# Patient Record
Sex: FEMALE | Race: WHITE
[De-identification: names, ages, dates, MRNs, and addresses within clinical notes are randomized per-mention and may not be internally consistent; named-entity substitution may affect disease eponyms.]

---

## 2019-11-25 ENCOUNTER — HOSPITAL ENCOUNTER (OUTPATIENT)
Dept: HOSPITAL 8 - STAR | Age: 69
Discharge: HOME | End: 2019-11-25
Attending: SPECIALIST
Payer: MEDICARE

## 2019-11-25 DIAGNOSIS — Z01.818: Primary | ICD-10-CM

## 2019-11-25 DIAGNOSIS — R10.2: ICD-10-CM

## 2019-11-25 DIAGNOSIS — R32: ICD-10-CM

## 2019-11-25 DIAGNOSIS — Z87.891: ICD-10-CM

## 2019-11-25 LAB
CULTURE INDICATED?: NO
MICROSCOPIC: (no result)

## 2019-11-25 PROCEDURE — 93005 ELECTROCARDIOGRAM TRACING: CPT

## 2019-11-25 PROCEDURE — 81001 URINALYSIS AUTO W/SCOPE: CPT

## 2019-12-09 ENCOUNTER — HOSPITAL ENCOUNTER (OUTPATIENT)
Dept: HOSPITAL 8 - OUT | Age: 69
Discharge: HOME | End: 2019-12-09
Attending: SPECIALIST
Payer: MEDICARE

## 2019-12-09 VITALS — HEIGHT: 62 IN | BODY MASS INDEX: 24.5 KG/M2 | WEIGHT: 133.16 LBS

## 2019-12-09 DIAGNOSIS — E11.9: ICD-10-CM

## 2019-12-09 DIAGNOSIS — Z88.5: ICD-10-CM

## 2019-12-09 DIAGNOSIS — N99.3: Primary | ICD-10-CM

## 2019-12-09 DIAGNOSIS — Z79.899: ICD-10-CM

## 2019-12-09 DIAGNOSIS — N39.46: ICD-10-CM

## 2019-12-09 DIAGNOSIS — J45.909: ICD-10-CM

## 2019-12-09 DIAGNOSIS — R10.2: ICD-10-CM

## 2019-12-09 DIAGNOSIS — I10: ICD-10-CM

## 2019-12-09 DIAGNOSIS — Z87.891: ICD-10-CM

## 2019-12-09 PROCEDURE — C1771 REP DEV, URINARY, W/SLING: HCPCS

## 2019-12-09 PROCEDURE — 57288 REPAIR BLADDER DEFECT: CPT

## 2019-12-09 PROCEDURE — 57265 CMBN AP COLPRHY W/NTRCL RPR: CPT

## 2019-12-09 PROCEDURE — 57282 COLPOPEXY EXTRAPERITONEAL: CPT

## 2020-06-18 ENCOUNTER — HOSPITAL ENCOUNTER (OUTPATIENT)
Dept: HOSPITAL 8 - STAR | Age: 70
Discharge: HOME | End: 2020-06-18
Attending: SPECIALIST
Payer: MEDICARE

## 2020-06-18 DIAGNOSIS — N39.3: ICD-10-CM

## 2020-06-18 DIAGNOSIS — Z01.818: Primary | ICD-10-CM

## 2020-06-18 DIAGNOSIS — R10.2: ICD-10-CM

## 2020-06-18 PROCEDURE — 93005 ELECTROCARDIOGRAM TRACING: CPT

## 2020-06-18 PROCEDURE — 36415 COLL VENOUS BLD VENIPUNCTURE: CPT

## 2020-06-18 PROCEDURE — 87635 SARS-COV-2 COVID-19 AMP PRB: CPT

## 2020-06-22 ENCOUNTER — HOSPITAL ENCOUNTER (OUTPATIENT)
Dept: HOSPITAL 8 - OUT | Age: 70
Discharge: HOME | End: 2020-06-22
Attending: SPECIALIST
Payer: MEDICARE

## 2020-06-22 VITALS — BODY MASS INDEX: 24.14 KG/M2 | HEIGHT: 62 IN | WEIGHT: 131.18 LBS

## 2020-06-22 VITALS — DIASTOLIC BLOOD PRESSURE: 79 MMHG | SYSTOLIC BLOOD PRESSURE: 212 MMHG

## 2020-06-22 DIAGNOSIS — N81.10: ICD-10-CM

## 2020-06-22 DIAGNOSIS — I10: ICD-10-CM

## 2020-06-22 DIAGNOSIS — J44.9: ICD-10-CM

## 2020-06-22 DIAGNOSIS — Z11.59: ICD-10-CM

## 2020-06-22 DIAGNOSIS — R10.2: ICD-10-CM

## 2020-06-22 DIAGNOSIS — N39.46: ICD-10-CM

## 2020-06-22 DIAGNOSIS — Z88.5: ICD-10-CM

## 2020-06-22 DIAGNOSIS — Z90.710: ICD-10-CM

## 2020-06-22 DIAGNOSIS — N81.11: ICD-10-CM

## 2020-06-22 DIAGNOSIS — N81.6: ICD-10-CM

## 2020-06-22 DIAGNOSIS — E78.00: ICD-10-CM

## 2020-06-22 DIAGNOSIS — Z79.899: ICD-10-CM

## 2020-06-22 DIAGNOSIS — N81.89: Primary | ICD-10-CM

## 2020-06-22 DIAGNOSIS — E11.9: ICD-10-CM

## 2020-06-22 PROCEDURE — 57282 COLPOPEXY EXTRAPERITONEAL: CPT

## 2020-06-22 PROCEDURE — 94640 AIRWAY INHALATION TREATMENT: CPT

## 2020-06-22 PROCEDURE — 57288 REPAIR BLADDER DEFECT: CPT

## 2020-06-22 PROCEDURE — 36415 COLL VENOUS BLD VENIPUNCTURE: CPT

## 2020-06-22 PROCEDURE — 57265 CMBN AP COLPRHY W/NTRCL RPR: CPT

## 2020-06-22 PROCEDURE — C1771 REP DEV, URINARY, W/SLING: HCPCS

## 2020-06-22 PROCEDURE — 87635 SARS-COV-2 COVID-19 AMP PRB: CPT

## 2020-06-22 RX ADMIN — OXYCODONE HYDROCHLORIDE PRN MG: 5 SOLUTION ORAL at 16:46

## 2020-06-22 RX ADMIN — HYDRALAZINE HYDROCHLORIDE PRN MG: 20 INJECTION INTRAMUSCULAR; INTRAVENOUS at 16:42

## 2020-06-22 RX ADMIN — OXYCODONE HYDROCHLORIDE PRN MG: 5 SOLUTION ORAL at 21:46

## 2020-06-22 RX ADMIN — FENTANYL CITRATE PRN MCG: 50 INJECTION INTRAMUSCULAR; INTRAVENOUS at 17:05

## 2020-06-22 RX ADMIN — OXYCODONE HYDROCHLORIDE PRN MG: 5 SOLUTION ORAL at 18:33

## 2020-06-22 RX ADMIN — FENTANYL CITRATE PRN MCG: 50 INJECTION INTRAMUSCULAR; INTRAVENOUS at 16:52

## 2020-06-22 RX ADMIN — HYDRALAZINE HYDROCHLORIDE PRN MG: 20 INJECTION INTRAMUSCULAR; INTRAVENOUS at 16:21

## 2020-06-22 RX ADMIN — HYDRALAZINE HYDROCHLORIDE PRN MG: 20 INJECTION INTRAMUSCULAR; INTRAVENOUS at 21:47

## 2022-11-03 ENCOUNTER — HOSPITAL ENCOUNTER (INPATIENT)
Facility: MEDICAL CENTER | Age: 72
LOS: 6 days | DRG: 674 | End: 2022-11-09
Attending: INTERNAL MEDICINE | Admitting: INTERNAL MEDICINE
Payer: MEDICARE

## 2022-11-03 DIAGNOSIS — N18.4 ACUTE RENAL FAILURE SUPERIMPOSED ON STAGE 4 CHRONIC KIDNEY DISEASE, UNSPECIFIED ACUTE RENAL FAILURE TYPE (HCC): ICD-10-CM

## 2022-11-03 DIAGNOSIS — J44.1 CHRONIC OBSTRUCTIVE PULMONARY DISEASE WITH ACUTE EXACERBATION (HCC): ICD-10-CM

## 2022-11-03 DIAGNOSIS — R11.0 NAUSEA: ICD-10-CM

## 2022-11-03 DIAGNOSIS — N17.9 ACUTE RENAL FAILURE SUPERIMPOSED ON STAGE 4 CHRONIC KIDNEY DISEASE, UNSPECIFIED ACUTE RENAL FAILURE TYPE (HCC): ICD-10-CM

## 2022-11-03 LAB
ALBUMIN SERPL BCP-MCNC: 4.4 G/DL (ref 3.2–4.9)
ALBUMIN SERPL BCP-MCNC: 4.4 G/DL (ref 3.2–4.9)
ALBUMIN/GLOB SERPL: 1.6 G/DL
ALP SERPL-CCNC: 71 U/L (ref 30–99)
ALT SERPL-CCNC: 13 U/L (ref 2–50)
ANION GAP SERPL CALC-SCNC: 20 MMOL/L (ref 7–16)
AST SERPL-CCNC: 13 U/L (ref 12–45)
BASOPHILS # BLD AUTO: 0.1 % (ref 0–1.8)
BASOPHILS # BLD: 0.01 K/UL (ref 0–0.12)
BILIRUB SERPL-MCNC: 0.2 MG/DL (ref 0.1–1.5)
BUN SERPL-MCNC: 94 MG/DL (ref 8–22)
BUN SERPL-MCNC: 94 MG/DL (ref 8–22)
CALCIUM SERPL-MCNC: 8.4 MG/DL (ref 8.5–10.5)
CALCIUM SERPL-MCNC: 8.5 MG/DL (ref 8.5–10.5)
CHLORIDE SERPL-SCNC: 97 MMOL/L (ref 96–112)
CHLORIDE SERPL-SCNC: 98 MMOL/L (ref 96–112)
CO2 SERPL-SCNC: 21 MMOL/L (ref 20–33)
CO2 SERPL-SCNC: 21 MMOL/L (ref 20–33)
CREAT SERPL-MCNC: 5.74 MG/DL (ref 0.5–1.4)
CREAT SERPL-MCNC: 5.82 MG/DL (ref 0.5–1.4)
CRP SERPL HS-MCNC: 0.47 MG/DL (ref 0–0.75)
EKG IMPRESSION: NORMAL
EOSINOPHIL # BLD AUTO: 0 K/UL (ref 0–0.51)
EOSINOPHIL NFR BLD: 0 % (ref 0–6.9)
ERYTHROCYTE [DISTWIDTH] IN BLOOD BY AUTOMATED COUNT: 43 FL (ref 35.9–50)
ERYTHROCYTE [SEDIMENTATION RATE] IN BLOOD BY WESTERGREN METHOD: 23 MM/HOUR (ref 0–25)
GFR SERPLBLD CREATININE-BSD FMLA CKD-EPI: 7 ML/MIN/1.73 M 2
GFR SERPLBLD CREATININE-BSD FMLA CKD-EPI: 7 ML/MIN/1.73 M 2
GLOBULIN SER CALC-MCNC: 2.8 G/DL (ref 1.9–3.5)
GLUCOSE SERPL-MCNC: 180 MG/DL (ref 65–99)
GLUCOSE SERPL-MCNC: 183 MG/DL (ref 65–99)
HCT VFR BLD AUTO: 35.5 % (ref 37–47)
HGB BLD-MCNC: 11.6 G/DL (ref 12–16)
IMM GRANULOCYTES # BLD AUTO: 0.16 K/UL (ref 0–0.11)
IMM GRANULOCYTES NFR BLD AUTO: 1.2 % (ref 0–0.9)
LYMPHOCYTES # BLD AUTO: 0.64 K/UL (ref 1–4.8)
LYMPHOCYTES NFR BLD: 4.7 % (ref 22–41)
MAGNESIUM SERPL-MCNC: 2.2 MG/DL (ref 1.5–2.5)
MCH RBC QN AUTO: 30.6 PG (ref 27–33)
MCHC RBC AUTO-ENTMCNC: 32.7 G/DL (ref 33.6–35)
MCV RBC AUTO: 93.7 FL (ref 81.4–97.8)
MONOCYTES # BLD AUTO: 0.32 K/UL (ref 0–0.85)
MONOCYTES NFR BLD AUTO: 2.4 % (ref 0–13.4)
NEUTROPHILS # BLD AUTO: 12.42 K/UL (ref 2–7.15)
NEUTROPHILS NFR BLD: 91.6 % (ref 44–72)
NRBC # BLD AUTO: 0 K/UL
NRBC BLD-RTO: 0 /100 WBC
PHOSPHATE SERPL-MCNC: 7.9 MG/DL (ref 2.5–4.5)
PHOSPHATE SERPL-MCNC: 7.9 MG/DL (ref 2.5–4.5)
PLATELET # BLD AUTO: 349 K/UL (ref 164–446)
PMV BLD AUTO: 9.6 FL (ref 9–12.9)
POTASSIUM SERPL-SCNC: 4 MMOL/L (ref 3.6–5.5)
POTASSIUM SERPL-SCNC: 4.1 MMOL/L (ref 3.6–5.5)
PROT SERPL-MCNC: 7.2 G/DL (ref 6–8.2)
RBC # BLD AUTO: 3.79 M/UL (ref 4.2–5.4)
SODIUM SERPL-SCNC: 138 MMOL/L (ref 135–145)
SODIUM SERPL-SCNC: 139 MMOL/L (ref 135–145)
URATE SERPL-MCNC: 9.3 MG/DL (ref 1.9–8.2)
WBC # BLD AUTO: 13.6 K/UL (ref 4.8–10.8)

## 2022-11-03 PROCEDURE — 99223 1ST HOSP IP/OBS HIGH 75: CPT | Performed by: STUDENT IN AN ORGANIZED HEALTH CARE EDUCATION/TRAINING PROGRAM

## 2022-11-03 PROCEDURE — 83880 ASSAY OF NATRIURETIC PEPTIDE: CPT

## 2022-11-03 PROCEDURE — 36415 COLL VENOUS BLD VENIPUNCTURE: CPT

## 2022-11-03 PROCEDURE — 80053 COMPREHEN METABOLIC PANEL: CPT

## 2022-11-03 PROCEDURE — 83735 ASSAY OF MAGNESIUM: CPT

## 2022-11-03 PROCEDURE — 80069 RENAL FUNCTION PANEL: CPT

## 2022-11-03 PROCEDURE — 770006 HCHG ROOM/CARE - MED/SURG/GYN SEMI*

## 2022-11-03 PROCEDURE — 84550 ASSAY OF BLOOD/URIC ACID: CPT

## 2022-11-03 PROCEDURE — 85025 COMPLETE CBC W/AUTO DIFF WBC: CPT

## 2022-11-03 PROCEDURE — 84100 ASSAY OF PHOSPHORUS: CPT

## 2022-11-03 PROCEDURE — 700102 HCHG RX REV CODE 250 W/ 637 OVERRIDE(OP): Performed by: STUDENT IN AN ORGANIZED HEALTH CARE EDUCATION/TRAINING PROGRAM

## 2022-11-03 PROCEDURE — 84145 PROCALCITONIN (PCT): CPT

## 2022-11-03 PROCEDURE — 86140 C-REACTIVE PROTEIN: CPT

## 2022-11-03 PROCEDURE — A9270 NON-COVERED ITEM OR SERVICE: HCPCS | Performed by: STUDENT IN AN ORGANIZED HEALTH CARE EDUCATION/TRAINING PROGRAM

## 2022-11-03 PROCEDURE — 93010 ELECTROCARDIOGRAM REPORT: CPT | Performed by: INTERNAL MEDICINE

## 2022-11-03 PROCEDURE — 93005 ELECTROCARDIOGRAM TRACING: CPT | Performed by: STUDENT IN AN ORGANIZED HEALTH CARE EDUCATION/TRAINING PROGRAM

## 2022-11-03 PROCEDURE — 85652 RBC SED RATE AUTOMATED: CPT

## 2022-11-03 PROCEDURE — 82550 ASSAY OF CK (CPK): CPT

## 2022-11-03 RX ORDER — ACETAMINOPHEN 325 MG/1
650 TABLET ORAL EVERY 6 HOURS PRN
Status: DISCONTINUED | OUTPATIENT
Start: 2022-11-03 | End: 2022-11-09 | Stop reason: HOSPADM

## 2022-11-03 RX ORDER — DIPHENHYDRAMINE HCL 25 MG
25 TABLET ORAL EVERY 6 HOURS PRN
Status: ON HOLD | COMMUNITY
End: 2022-11-09

## 2022-11-03 RX ORDER — CARVEDILOL 25 MG/1
25 TABLET ORAL 2 TIMES DAILY WITH MEALS
COMMUNITY

## 2022-11-03 RX ORDER — POTASSIUM CITRATE 10 MEQ/1
10 TABLET, EXTENDED RELEASE ORAL DAILY
COMMUNITY

## 2022-11-03 RX ORDER — EZETIMIBE 10 MG/1
10 TABLET ORAL DAILY
COMMUNITY

## 2022-11-03 RX ORDER — ALBUTEROL SULFATE 90 UG/1
2 AEROSOL, METERED RESPIRATORY (INHALATION) EVERY 4 HOURS PRN
COMMUNITY

## 2022-11-03 RX ORDER — HYDRALAZINE HYDROCHLORIDE 50 MG/1
100 TABLET, FILM COATED ORAL EVERY 8 HOURS
Status: DISCONTINUED | OUTPATIENT
Start: 2022-11-03 | End: 2022-11-09 | Stop reason: HOSPADM

## 2022-11-03 RX ORDER — CARVEDILOL 25 MG/1
25 TABLET ORAL 2 TIMES DAILY WITH MEALS
Status: DISCONTINUED | OUTPATIENT
Start: 2022-11-03 | End: 2022-11-09 | Stop reason: HOSPADM

## 2022-11-03 RX ORDER — CALCIUM ACETATE 667 MG/1
667 TABLET ORAL
Status: DISCONTINUED | OUTPATIENT
Start: 2022-11-04 | End: 2022-11-09 | Stop reason: HOSPADM

## 2022-11-03 RX ORDER — LABETALOL HYDROCHLORIDE 5 MG/ML
10 INJECTION, SOLUTION INTRAVENOUS EVERY 4 HOURS PRN
Status: DISCONTINUED | OUTPATIENT
Start: 2022-11-03 | End: 2022-11-09 | Stop reason: HOSPADM

## 2022-11-03 RX ORDER — AMOXICILLIN 250 MG
2 CAPSULE ORAL 2 TIMES DAILY
Status: DISCONTINUED | OUTPATIENT
Start: 2022-11-04 | End: 2022-11-09 | Stop reason: HOSPADM

## 2022-11-03 RX ORDER — AMLODIPINE BESYLATE 10 MG/1
10 TABLET ORAL
Status: DISCONTINUED | OUTPATIENT
Start: 2022-11-04 | End: 2022-11-09 | Stop reason: HOSPADM

## 2022-11-03 RX ORDER — TRAZODONE HYDROCHLORIDE 50 MG/1
50 TABLET ORAL
Status: DISCONTINUED | OUTPATIENT
Start: 2022-11-03 | End: 2022-11-09 | Stop reason: HOSPADM

## 2022-11-03 RX ORDER — IPRATROPIUM BROMIDE AND ALBUTEROL SULFATE 2.5; .5 MG/3ML; MG/3ML
3 SOLUTION RESPIRATORY (INHALATION)
Status: DISCONTINUED | OUTPATIENT
Start: 2022-11-03 | End: 2022-11-09 | Stop reason: HOSPADM

## 2022-11-03 RX ORDER — PAROXETINE 10 MG/1
10 TABLET, FILM COATED ORAL DAILY
COMMUNITY

## 2022-11-03 RX ORDER — TRAZODONE HYDROCHLORIDE 50 MG/1
25 TABLET ORAL NIGHTLY
COMMUNITY

## 2022-11-03 RX ORDER — AMLODIPINE BESYLATE 10 MG/1
10 TABLET ORAL DAILY
COMMUNITY

## 2022-11-03 RX ORDER — LOPERAMIDE HYDROCHLORIDE 2 MG/1
2 CAPSULE ORAL 4 TIMES DAILY PRN
COMMUNITY

## 2022-11-03 RX ORDER — PAROXETINE 10 MG/1
10 TABLET, FILM COATED ORAL DAILY
Status: DISCONTINUED | OUTPATIENT
Start: 2022-11-04 | End: 2022-11-09 | Stop reason: HOSPADM

## 2022-11-03 RX ORDER — BISACODYL 10 MG
10 SUPPOSITORY, RECTAL RECTAL
Status: DISCONTINUED | OUTPATIENT
Start: 2022-11-03 | End: 2022-11-09 | Stop reason: HOSPADM

## 2022-11-03 RX ORDER — EZETIMIBE 10 MG/1
10 TABLET ORAL DAILY
Status: DISCONTINUED | OUTPATIENT
Start: 2022-11-04 | End: 2022-11-09 | Stop reason: HOSPADM

## 2022-11-03 RX ORDER — TORSEMIDE 20 MG/1
20 TABLET ORAL DAILY
COMMUNITY

## 2022-11-03 RX ORDER — POLYETHYLENE GLYCOL 3350 17 G/17G
1 POWDER, FOR SOLUTION ORAL
Status: DISCONTINUED | OUTPATIENT
Start: 2022-11-03 | End: 2022-11-09 | Stop reason: HOSPADM

## 2022-11-03 RX ORDER — GUAIFENESIN 600 MG/1
600 TABLET, EXTENDED RELEASE ORAL EVERY 12 HOURS
COMMUNITY

## 2022-11-03 RX ADMIN — CARVEDILOL 25 MG: 25 TABLET, FILM COATED ORAL at 21:48

## 2022-11-03 RX ADMIN — TRAZODONE HYDROCHLORIDE 50 MG: 50 TABLET ORAL at 21:49

## 2022-11-03 RX ADMIN — HYDRALAZINE HYDROCHLORIDE 100 MG: 50 TABLET, FILM COATED ORAL at 21:48

## 2022-11-03 ASSESSMENT — COGNITIVE AND FUNCTIONAL STATUS - GENERAL
SUGGESTED CMS G CODE MODIFIER DAILY ACTIVITY: CH
DAILY ACTIVITIY SCORE: 24
SUGGESTED CMS G CODE MODIFIER MOBILITY: CH
MOBILITY SCORE: 24

## 2022-11-03 ASSESSMENT — PATIENT HEALTH QUESTIONNAIRE - PHQ9
1. LITTLE INTEREST OR PLEASURE IN DOING THINGS: NOT AT ALL
SUM OF ALL RESPONSES TO PHQ9 QUESTIONS 1 AND 2: 0
2. FEELING DOWN, DEPRESSED, IRRITABLE, OR HOPELESS: NOT AT ALL

## 2022-11-03 ASSESSMENT — LIFESTYLE VARIABLES
EVER FELT BAD OR GUILTY ABOUT YOUR DRINKING: NO
ALCOHOL_USE: NO
HAVE YOU EVER FELT YOU SHOULD CUT DOWN ON YOUR DRINKING: NO
CONSUMPTION TOTAL: NEGATIVE
TOTAL SCORE: 0
HOW MANY TIMES IN THE PAST YEAR HAVE YOU HAD 5 OR MORE DRINKS IN A DAY: 0
HAVE PEOPLE ANNOYED YOU BY CRITICIZING YOUR DRINKING: NO
TOTAL SCORE: 0
TOTAL SCORE: 0
AVERAGE NUMBER OF DAYS PER WEEK YOU HAVE A DRINK CONTAINING ALCOHOL: 0
ON A TYPICAL DAY WHEN YOU DRINK ALCOHOL HOW MANY DRINKS DO YOU HAVE: 0
EVER HAD A DRINK FIRST THING IN THE MORNING TO STEADY YOUR NERVES TO GET RID OF A HANGOVER: NO

## 2022-11-03 ASSESSMENT — PAIN DESCRIPTION - PAIN TYPE: TYPE: ACUTE PAIN

## 2022-11-03 NOTE — PROGRESS NOTES
[facility]  Kannan was admitted at the outside facility for COPD and shortness of breath., She has known CKD. COPD exac resolved, no evidence of pneumonia however Cr- creeped up from 3 to 5. K WNL bicarb 18. Per hospitalist there no NSAIDs given no hypotension (actually patient hypertensive), no renotoxins, no clinical signs of rhabdomyolysis. Urionalysis no evidence of UTI. No new drugs or changes to medications. I asked if a CT or ultrasound was done to r/o obstructive process, per hospitalist this was ordered and PENDING.  Dr. Butler Nephrology was called and she recommended transfer to [facility] for Nephrology evaluation.

## 2022-11-04 ENCOUNTER — APPOINTMENT (OUTPATIENT)
Dept: RADIOLOGY | Facility: MEDICAL CENTER | Age: 72
DRG: 674 | End: 2022-11-04
Attending: HOSPITALIST
Payer: MEDICARE

## 2022-11-04 PROBLEM — I35.0 MODERATE AORTIC STENOSIS: Status: ACTIVE | Noted: 2022-11-04

## 2022-11-04 PROBLEM — I10 PRIMARY HYPERTENSION: Status: ACTIVE | Noted: 2022-11-04

## 2022-11-04 PROBLEM — D72.829 LEUKOCYTOSIS: Status: ACTIVE | Noted: 2022-11-04

## 2022-11-04 PROBLEM — E11.9 DIABETES MELLITUS (HCC): Status: ACTIVE | Noted: 2022-11-04

## 2022-11-04 PROBLEM — J44.9 COPD (CHRONIC OBSTRUCTIVE PULMONARY DISEASE) (HCC): Status: ACTIVE | Noted: 2022-11-04

## 2022-11-04 PROBLEM — I27.21 PULMONARY ARTERY HYPERTENSION (HCC): Status: ACTIVE | Noted: 2022-11-04

## 2022-11-04 PROBLEM — I50.32 CHRONIC HEART FAILURE WITH PRESERVED EJECTION FRACTION (HCC): Status: ACTIVE | Noted: 2022-11-04

## 2022-11-04 PROBLEM — D64.9 ANEMIA: Status: ACTIVE | Noted: 2022-11-04

## 2022-11-04 PROBLEM — N18.4 ACUTE RENAL FAILURE SUPERIMPOSED ON STAGE 4 CHRONIC KIDNEY DISEASE (HCC): Status: ACTIVE | Noted: 2022-11-03

## 2022-11-04 LAB
BASOPHILS # BLD AUTO: 0.1 % (ref 0–1.8)
BASOPHILS # BLD: 0.01 K/UL (ref 0–0.12)
CK SERPL-CCNC: 48 U/L (ref 0–154)
CREAT UR-MCNC: 49.41 MG/DL
EOSINOPHIL # BLD AUTO: 0 K/UL (ref 0–0.51)
EOSINOPHIL NFR BLD: 0 % (ref 0–6.9)
ERYTHROCYTE [DISTWIDTH] IN BLOOD BY AUTOMATED COUNT: 42.3 FL (ref 35.9–50)
GLUCOSE BLD STRIP.AUTO-MCNC: 104 MG/DL (ref 65–99)
GLUCOSE BLD STRIP.AUTO-MCNC: 125 MG/DL (ref 65–99)
GLUCOSE BLD STRIP.AUTO-MCNC: 139 MG/DL (ref 65–99)
GLUCOSE BLD STRIP.AUTO-MCNC: 142 MG/DL (ref 65–99)
HCT VFR BLD AUTO: 34.2 % (ref 37–47)
HGB BLD-MCNC: 10.8 G/DL (ref 12–16)
IMM GRANULOCYTES # BLD AUTO: 0.13 K/UL (ref 0–0.11)
IMM GRANULOCYTES NFR BLD AUTO: 1.3 % (ref 0–0.9)
LYMPHOCYTES # BLD AUTO: 0.66 K/UL (ref 1–4.8)
LYMPHOCYTES NFR BLD: 6.8 % (ref 22–41)
MCH RBC QN AUTO: 29.3 PG (ref 27–33)
MCHC RBC AUTO-ENTMCNC: 31.6 G/DL (ref 33.6–35)
MCV RBC AUTO: 92.7 FL (ref 81.4–97.8)
MONOCYTES # BLD AUTO: 0.48 K/UL (ref 0–0.85)
MONOCYTES NFR BLD AUTO: 5 % (ref 0–13.4)
NEUTROPHILS # BLD AUTO: 8.36 K/UL (ref 2–7.15)
NEUTROPHILS NFR BLD: 86.8 % (ref 44–72)
NRBC # BLD AUTO: 0 K/UL
NRBC BLD-RTO: 0 /100 WBC
NT-PROBNP SERPL IA-MCNC: ABNORMAL PG/ML (ref 0–125)
NT-PROBNP SERPL IA-MCNC: ABNORMAL PG/ML (ref 0–125)
PLATELET # BLD AUTO: 301 K/UL (ref 164–446)
PMV BLD AUTO: 9.6 FL (ref 9–12.9)
PROCALCITONIN SERPL-MCNC: 0.13 NG/ML
PROCALCITONIN SERPL-MCNC: 0.13 NG/ML
RBC # BLD AUTO: 3.69 M/UL (ref 4.2–5.4)
SODIUM UR-SCNC: 71 MMOL/L
WBC # BLD AUTO: 9.6 K/UL (ref 4.8–10.8)

## 2022-11-04 PROCEDURE — 36415 COLL VENOUS BLD VENIPUNCTURE: CPT

## 2022-11-04 PROCEDURE — 84300 ASSAY OF URINE SODIUM: CPT

## 2022-11-04 PROCEDURE — 84145 PROCALCITONIN (PCT): CPT

## 2022-11-04 PROCEDURE — 71045 X-RAY EXAM CHEST 1 VIEW: CPT

## 2022-11-04 PROCEDURE — 700102 HCHG RX REV CODE 250 W/ 637 OVERRIDE(OP): Performed by: STUDENT IN AN ORGANIZED HEALTH CARE EDUCATION/TRAINING PROGRAM

## 2022-11-04 PROCEDURE — 700111 HCHG RX REV CODE 636 W/ 250 OVERRIDE (IP): Performed by: STUDENT IN AN ORGANIZED HEALTH CARE EDUCATION/TRAINING PROGRAM

## 2022-11-04 PROCEDURE — 85025 COMPLETE CBC W/AUTO DIFF WBC: CPT

## 2022-11-04 PROCEDURE — 83880 ASSAY OF NATRIURETIC PEPTIDE: CPT

## 2022-11-04 PROCEDURE — 700111 HCHG RX REV CODE 636 W/ 250 OVERRIDE (IP): Performed by: HOSPITALIST

## 2022-11-04 PROCEDURE — 82570 ASSAY OF URINE CREATININE: CPT

## 2022-11-04 PROCEDURE — 770006 HCHG ROOM/CARE - MED/SURG/GYN SEMI*

## 2022-11-04 PROCEDURE — 82962 GLUCOSE BLOOD TEST: CPT | Mod: 91

## 2022-11-04 PROCEDURE — A9270 NON-COVERED ITEM OR SERVICE: HCPCS | Performed by: STUDENT IN AN ORGANIZED HEALTH CARE EDUCATION/TRAINING PROGRAM

## 2022-11-04 PROCEDURE — 51798 US URINE CAPACITY MEASURE: CPT

## 2022-11-04 PROCEDURE — 99232 SBSQ HOSP IP/OBS MODERATE 35: CPT | Performed by: HOSPITALIST

## 2022-11-04 RX ORDER — HEPARIN SODIUM 5000 [USP'U]/ML
5000 INJECTION, SOLUTION INTRAVENOUS; SUBCUTANEOUS EVERY 8 HOURS
Status: DISCONTINUED | OUTPATIENT
Start: 2022-11-04 | End: 2022-11-09 | Stop reason: HOSPADM

## 2022-11-04 RX ORDER — ONDANSETRON 2 MG/ML
4 INJECTION INTRAMUSCULAR; INTRAVENOUS EVERY 4 HOURS PRN
Status: DISCONTINUED | OUTPATIENT
Start: 2022-11-04 | End: 2022-11-09 | Stop reason: HOSPADM

## 2022-11-04 RX ADMIN — SENNOSIDES AND DOCUSATE SODIUM 2 TABLET: 50; 8.6 TABLET ORAL at 06:09

## 2022-11-04 RX ADMIN — CARVEDILOL 25 MG: 25 TABLET, FILM COATED ORAL at 18:11

## 2022-11-04 RX ADMIN — Medication 667 MG: at 08:34

## 2022-11-04 RX ADMIN — Medication 667 MG: at 13:54

## 2022-11-04 RX ADMIN — CARVEDILOL 25 MG: 25 TABLET, FILM COATED ORAL at 08:34

## 2022-11-04 RX ADMIN — HYDRALAZINE HYDROCHLORIDE 100 MG: 50 TABLET, FILM COATED ORAL at 06:10

## 2022-11-04 RX ADMIN — HEPARIN SODIUM 5000 UNITS: 5000 INJECTION, SOLUTION INTRAVENOUS; SUBCUTANEOUS at 20:45

## 2022-11-04 RX ADMIN — HYDRALAZINE HYDROCHLORIDE 100 MG: 50 TABLET, FILM COATED ORAL at 20:44

## 2022-11-04 RX ADMIN — HEPARIN SODIUM 5000 UNITS: 5000 INJECTION, SOLUTION INTRAVENOUS; SUBCUTANEOUS at 06:10

## 2022-11-04 RX ADMIN — HYDRALAZINE HYDROCHLORIDE 100 MG: 50 TABLET, FILM COATED ORAL at 13:54

## 2022-11-04 RX ADMIN — AMLODIPINE BESYLATE 10 MG: 10 TABLET ORAL at 06:10

## 2022-11-04 RX ADMIN — HEPARIN SODIUM 5000 UNITS: 5000 INJECTION, SOLUTION INTRAVENOUS; SUBCUTANEOUS at 13:54

## 2022-11-04 RX ADMIN — ONDANSETRON 4 MG: 2 INJECTION INTRAMUSCULAR; INTRAVENOUS at 13:02

## 2022-11-04 RX ADMIN — TRAZODONE HYDROCHLORIDE 50 MG: 50 TABLET ORAL at 20:49

## 2022-11-04 RX ADMIN — PAROXETINE HYDROCHLORIDE 10 MG: 10 TABLET, FILM COATED ORAL at 06:10

## 2022-11-04 RX ADMIN — Medication 667 MG: at 18:11

## 2022-11-04 RX ADMIN — EZETIMIBE 10 MG: 10 TABLET ORAL at 06:09

## 2022-11-04 ASSESSMENT — COGNITIVE AND FUNCTIONAL STATUS - GENERAL
SUGGESTED CMS G CODE MODIFIER MOBILITY: CH
DAILY ACTIVITIY SCORE: 24
SUGGESTED CMS G CODE MODIFIER DAILY ACTIVITY: CH
MOBILITY SCORE: 24

## 2022-11-04 ASSESSMENT — LIFESTYLE VARIABLES: SUBSTANCE_ABUSE: 0

## 2022-11-04 ASSESSMENT — ENCOUNTER SYMPTOMS
WHEEZING: 1
MYALGIAS: 0
DIZZINESS: 0
COUGH: 1
SHORTNESS OF BREATH: 1
DIARRHEA: 0
HEARTBURN: 0
SINUS PAIN: 0
ABDOMINAL PAIN: 0
FALLS: 0
CONSTIPATION: 0
SPUTUM PRODUCTION: 1
TREMORS: 0
CLAUDICATION: 0
FEVER: 0
NAUSEA: 0
COUGH: 0
PHOTOPHOBIA: 0
CHILLS: 0
HEADACHES: 0
HEMOPTYSIS: 0
FOCAL WEAKNESS: 0
DEPRESSION: 0
NERVOUS/ANXIOUS: 0
DOUBLE VISION: 0
BLURRED VISION: 0
SPUTUM PRODUCTION: 0
TINGLING: 0
SPEECH CHANGE: 0
PALPITATIONS: 0
VOMITING: 0
BACK PAIN: 0
SENSORY CHANGE: 0
ORTHOPNEA: 0

## 2022-11-04 ASSESSMENT — PAIN DESCRIPTION - PAIN TYPE
TYPE: ACUTE PAIN

## 2022-11-04 NOTE — PROGRESS NOTES
Assumed care of pt  at 1900. Report received from Day RN. Pt is A&O x 4. Patient denies pain at this time. Bed in lowest locked position, call light within reach, hourly rounding in place. Labs reviewed, orders reviewed, communication board updated. Per the orders, pt's EKG was resulted and Dr. Oliveros was made aware.

## 2022-11-04 NOTE — PROGRESS NOTES
Hospital Medicine Daily Progress Note    Date of Service  11/4/2022    Chief Complaint  Jesi Galloway is a 72 y.o. female admitted 11/3/2022 with worsening kidney function    Hospital Course  No notes on file    Interval Problem Update  Patient states that she is making urine    Patient denies any pain    Creatinine is elevated 5.82    Patient states that her normal creatinine is around 3    Dr.'s lugo of nephrology is aware and will be consulting    I have discussed this patient's plan of care and discharge plan at IDT rounds today with Case Management, Nursing, Nursing leadership, and other members of the IDT team.    Consultants/Specialty  nephrology    Code Status  Full Code    Disposition  Patient is not medically cleared for discharge.   Anticipate discharge to to home with close outpatient follow-up.  I have placed the appropriate orders for post-discharge needs.    Review of Systems  Review of Systems   Constitutional:  Negative for chills and fever.   HENT:  Negative for ear pain, hearing loss and tinnitus.    Eyes:  Negative for blurred vision, double vision and photophobia.   Respiratory:  Negative for cough, hemoptysis and sputum production.    Cardiovascular:  Negative for chest pain, palpitations, orthopnea and claudication.   Gastrointestinal:  Negative for abdominal pain, heartburn, nausea and vomiting.   Genitourinary:  Negative for dysuria, frequency and urgency.   Musculoskeletal:  Negative for back pain and myalgias.   Neurological:  Negative for dizziness, tingling, tremors, sensory change and headaches.   Psychiatric/Behavioral:  Negative for depression, substance abuse and suicidal ideas.    All other systems reviewed and are negative.     Physical Exam  Temp:  [36.1 °C (97 °F)-36.8 °C (98.3 °F)] 36.8 °C (98.3 °F)  Pulse:  [65-76] 75  Resp:  [16-19] 16  BP: (142-159)/(50-54) 143/50  SpO2:  [90 %] 90 %    Physical Exam  Constitutional:       General: She is not in acute distress.      Appearance: She is not ill-appearing, toxic-appearing or diaphoretic.   HENT:      Head: Normocephalic.      Mouth/Throat:      Mouth: Mucous membranes are moist.   Eyes:      General: No scleral icterus.        Left eye: No discharge.      Extraocular Movements: Extraocular movements intact.      Pupils: Pupils are equal, round, and reactive to light.   Cardiovascular:      Rate and Rhythm: Normal rate.      Heart sounds: No murmur heard.    No gallop.   Pulmonary:      Effort: Pulmonary effort is normal. No respiratory distress.      Breath sounds: Normal breath sounds. No wheezing or rales.   Abdominal:      General: There is no distension.      Palpations: There is no mass.      Tenderness: There is no abdominal tenderness. There is no guarding or rebound.      Hernia: No hernia is present.   Musculoskeletal:         General: No swelling or deformity. Normal range of motion.      Cervical back: Normal range of motion. No rigidity.   Skin:     Capillary Refill: Capillary refill takes 2 to 3 seconds.      Coloration: Skin is not jaundiced.      Findings: No bruising or lesion.   Neurological:      General: No focal deficit present.      Cranial Nerves: No cranial nerve deficit.      Motor: No weakness.      Gait: Gait normal.   Psychiatric:         Mood and Affect: Mood normal.       Fluids    Intake/Output Summary (Last 24 hours) at 11/4/2022 1004  Last data filed at 11/4/2022 0930  Gross per 24 hour   Intake --   Output 750 ml   Net -750 ml       Laboratory  Recent Labs     11/03/22 2007 11/04/22  0249   WBC 13.6* 9.6   RBC 3.79* 3.69*   HEMOGLOBIN 11.6* 10.8*   HEMATOCRIT 35.5* 34.2*   MCV 93.7 92.7   MCH 30.6 29.3   MCHC 32.7* 31.6*   RDW 43.0 42.3   PLATELETCT 349 301   MPV 9.6 9.6     Recent Labs     11/03/22 2007   SODIUM 138  139   POTASSIUM 4.0  4.1   CHLORIDE 97  98   CO2 21  21   GLUCOSE 180*  183*   BUN 94*  94*   CREATININE 5.82*  5.74*   CALCIUM 8.5  8.4*                   Imaging  No  orders to display        Assessment/Plan  * Acute renal failure superimposed on stage 4 chronic kidney disease (HCC)- (present on admission)  Assessment & Plan  Unclear etiology, no recent renal toxic medications, no contrast, no episodes of hypotension.  Received fluid bolus while out OSH with worsening renal function followed by 3 days of daily IV lasix and albumin again with worsening renal function.    Renal US normal from OSH    Renal dose meds and avoid nephrotoxins  Monitor I&O's  Follow renal function    Nephrology consulting    , started on PhosLo    Pulmonary artery hypertension (HCC)  Assessment & Plan  Estimated PA pressure 60 mmHg on recent echo, EF 65% grade 2 diastolic dysfunction moderate aortic stenosis moderate aortic regurg.  On room air, euvolemic.  Watch volume and respiratory status closely.  Continuous pulse ox    Diabetes mellitus (HCC)- (present on admission)  Assessment & Plan  Insulin sliding scale, diabetic diet, POC glucose checks    Anemia- (present on admission)  Assessment & Plan  No reports of hemorrhage, suspect secondary to chronic renal disease.  Continue to monitor and transfuse for hemoglobin less than 7.    Leukocytosis- (present on admission)  Assessment & Plan  I suspect secondary to steroid administration for COPD.  She has been receiving 40 mg Solu-Medrol every 8 hours, no longer wheezing and on room air.  No infectious symptoms or complaints.  Chest x-ray without evidence of infection, no dysuria, no abdominal pain or diarrhea, no concerning skin lesions, no dental pain or sinus pain.      Primary hypertension- (present on admission)  Assessment & Plan  Presented to OSH with systolic blood pressure in the 200s, on Coreg and amlodipine at home, hydralazine has been added at OSH due to persistent hypertension.  Continue amlodipine, hydralazine, Coreg.  IV antihypertensives ordered with parameters.    Moderate aortic stenosis- (present on admission)  Assessment & Plan  Noted  on TTE from OSH.  She is euvolemic, watch respiratory status and volume status closely.    Chronic heart failure with preserved ejection fraction (HCC)- (present on admission)  Assessment & Plan  Newly diagnosed HFpEF, TTE at OSH LVEF 65% PA pressure 60 mmHg grade 2 diastolic dysfunction moderate aortic stenosis moderate aortic regurg.  She has been receiving Lasix with albumin daily for the past 3 days for evaluation of renal failure, she is euvolemic.  Holding further diuretics and fluids prior to discussing with nephrology team,   Continue Coreg, statin  BNP on presentation at OSH 5700, troponin T 16    COPD (chronic obstructive pulmonary disease) (HCC)- (present on admission)  Assessment & Plan  Presented to OSH with acute exacerbation treated with 4 days of Solu-Medrol and doxycycline, was weaned down to room air on hospital day 1.  No wheeze, on room air, will not continue steroids or antibiotics at this time.  DuoNebs as needed, continuous pulse ox, RT consult       VTE prophylaxis: SCDs/TEDs    I have performed a physical exam and reviewed and updated ROS and Plan today (11/4/2022). In review of yesterday's note (11/3/2022), there are no changes except as documented above.

## 2022-11-04 NOTE — PROGRESS NOTES
Bedside report received from CARL Kinney. Assumed care of pt. Pt A/A/O x4. Denies pain or discomfort at this time. Call light and personal belongings within reach. Bed in low position with wheels locked.

## 2022-11-04 NOTE — H&P
Hospital Medicine History & Physical Note    Date of Service  11/3/2022    Primary Care Physician  No primary care provider on file.    Consultants  None    Code Status  Full Code    Chief Complaint  Cough, dyspnea      History of Presenting Illness  Jesi Galloway is a 72 y.o. female former smoker with COPD not on on home oxygen therapy, hypertension, CKD stage IV, diabetes mellitus, who presented 11/3/2022 as a transfer from [facility] for progressive renal failure.    Ms. Galloway presented to OSH on 10/30/2022 with chief complaint of cough and shortness of breath.  Cough and shortness of breath been ongoing since starting nifedipine 3 weeks prior, medication was stopped however symptoms persisted.  She was having a productive cough no fevers wheezing orthopnea.  Denies chest pain fever sick contacts urinary symptoms vomiting diarrhea abdominal pain.      She was hypoxic in the OSH ED O2 sats 83% on room air, chest x-ray reported as negative she was quite hypertensive blood pressure over 200 systolic, admitted for COPD exacerbation with hypoxia and hypertensive urgency.  She was weaned off oxygen on hospital day 1 and was able to ambulate with mild dyspnea.  When she initially presented there was no leukocytosis, hemoglobin was 10.9 normal platelets, normal electrolytes, creatinine 3.83 BUN 43.      On hospital day 2 creatinine worsened to 4.7, noted to have good urine output.  She was given 1 L bolus repeat creatinine in the afternoon showed worsening creatinine up to 4.9.  Patient reported that she was on torsemide, she was given Lasix 40 mg IV daily with albumin 25 g IV (ultimately for 3 days with progression of renal failure).  On hospital day 3 creatinine elevated to 5, hospital day 4 creatinine 5.3 serum bicarb 18 urine output 725 cc/in prior 24 hours, OSH hospitalist discussed with Dr. Butler from nephrology who recommended transfer for evaluation and treatment of acute on chronic renal  failure.  On day of transfer she was given albumin in the morning with a dose of Lasix in the afternoon.  She received 4 days of doxycycline and Solu-Medrol.    She was noted to have elevated BNP 5700 on OSH admission, echocardiogram obtained showed EF 65% PA pressure 60 mmHg, grade 2 diastolic dysfunction, moderate aortic stenosis, moderate aortic regurgitation.  She was persistently hypertensive, continued on home amlodipine and carvedilol, hydralazine was added.  She has been receiving methylprednisolone 40 mg every 8 hours.  Renal ultrasound from OSH shows no abnormalities.    Arrival to [facility] she is afebrile pulse 65-76 normal respiratory rate room air oxygen saturation 90%, systolic blood pressure 140s to 150s.  Labs obtained show hemoglobin 11.6, leukocytosis 13.6, CMP normal sodium and potassium, glucose 180 BUN 94 serum creatinine 5.82 calcium 8.4 normal liver function phosphorus 7.9 uric acid 9.3 ESR CRP not elevated.  She has no acute complaints and is in good spirits.    I discussed the plan of care with patient, bedside RN, and pharmacy.    Review of Systems  Review of Systems   Constitutional:  Negative for chills and fever.   HENT:  Negative for congestion and sinus pain.    Eyes:  Negative for blurred vision and double vision.   Respiratory:  Positive for cough, sputum production, shortness of breath and wheezing.    Cardiovascular:  Negative for chest pain and palpitations.   Gastrointestinal:  Negative for abdominal pain, constipation, diarrhea, nausea and vomiting.   Genitourinary:  Negative for dysuria, frequency and urgency.   Musculoskeletal:  Negative for falls and joint pain.   Neurological:  Negative for sensory change, speech change and focal weakness.   Psychiatric/Behavioral:  Negative for substance abuse. The patient is not nervous/anxious.      Past Medical History   has no past medical history on file.    Surgical History   has no past surgical history on file.     Family  History  family history is not on file.   Family history reviewed with patient. There is no family history that is pertinent to the chief complaint.     Social History   reports that she quit smoking about 8 years ago. Her smoking use included cigarettes. She has never used smokeless tobacco.    Allergies  Allergies   Allergen Reactions    Codeine Nausea    Hydrocodone Nausea       Medications  None       Physical Exam  Temp:  [36.6 °C (97.8 °F)] 36.6 °C (97.8 °F)  Pulse:  [65] 65  Resp:  [17] 17  BP: (142)/(52) 142/52  SpO2:  [90 %] 90 %  Blood Pressure : (!) 142/52   Temperature: 36.6 °C (97.8 °F)   Pulse: 65   Respiration: 17   Pulse Oximetry: 90 %       Physical Exam  Vitals and nursing note reviewed.   Constitutional:       General: She is not in acute distress.     Appearance: She is not toxic-appearing.      Comments: 72-year-old female appears stated age alert and conversant able to speak full sentences without becoming breathless no acute distress nontoxic-appearing pleasant mood   HENT:      Head: Normocephalic and atraumatic.      Nose: Nose normal. No rhinorrhea.      Mouth/Throat:      Mouth: Mucous membranes are moist.      Pharynx: Oropharynx is clear.   Eyes:      General: No scleral icterus.     Extraocular Movements: Extraocular movements intact.      Conjunctiva/sclera: Conjunctivae normal.      Pupils: Pupils are equal, round, and reactive to light.   Cardiovascular:      Rate and Rhythm: Normal rate and regular rhythm.      Pulses: Normal pulses.      Heart sounds: Murmur heard.   Pulmonary:      Effort: Pulmonary effort is normal. No respiratory distress.      Breath sounds: Normal breath sounds. No wheezing, rhonchi or rales.   Abdominal:      Palpations: Abdomen is soft.      Tenderness: There is no abdominal tenderness. There is no guarding or rebound.   Musculoskeletal:         General: No tenderness or deformity. Normal range of motion.      Cervical back: Normal range of motion and neck  supple. No rigidity or tenderness.      Right lower leg: No edema.      Left lower leg: No edema.   Skin:     General: Skin is warm and dry.      Capillary Refill: Capillary refill takes less than 2 seconds.      Findings: No erythema or rash.   Neurological:      General: No focal deficit present.      Mental Status: She is alert and oriented to person, place, and time. Mental status is at baseline.      Cranial Nerves: No cranial nerve deficit.      Sensory: No sensory deficit.      Motor: No weakness.      Coordination: Coordination normal.   Psychiatric:         Mood and Affect: Mood normal.         Behavior: Behavior normal.         Thought Content: Thought content normal.         Judgment: Judgment normal.       Laboratory:          No results for input(s): ALTSGPT, ASTSGOT, ALKPHOSPHAT, TBILIRUBIN, DBILIRUBIN, GAMMAGT, AMYLASE, LIPASE, ALB, PREALBUMIN, GLUCOSE in the last 72 hours.      No results for input(s): NTPROBNP in the last 72 hours.      No results for input(s): TROPONINT in the last 72 hours.    Imaging:  No orders to display       no X-Ray or EKG requiring interpretation    Assessment/Plan:  Justification for Admission Status  I anticipate this patient will require at least two midnights for appropriate medical management, necessitating inpatient admission because renal failure      * Acute renal failure superimposed on stage 4 chronic kidney disease (HCC)- (present on admission)  Assessment & Plan  Unclear etiology, no recent renal toxic medications, no contrast, no episodes of hypotension.  Received fluid bolus while out OSH with worsening renal function followed by 3 days of daily IV lasix and albumin again with worsening renal function.  FeNa studies ordered  Renal US normal from OSH  Check U/a & CPK  Rule out post obstruction, TO requested OSH perform CT prior to transfer to r/o obstruction, per patient CT occurred and she was told there were no obstructions or other abnormalities but no  reports or documentation from OSH regarding this, will need to obtain CT results.  She is euvolemic.  Renal dose meds and avoid nephrotoxins  Monitor I&O's  Follow renal function  Consult nephrology in AM  Phosphorus 7.9, calcium 8.4, started on PhosLo    Pulmonary artery hypertension (HCC)  Assessment & Plan  Estimated PA pressure 60 mmHg on recent echo, EF 65% grade 2 diastolic dysfunction moderate aortic stenosis moderate aortic regurg.  On room air, euvolemic.  Watch volume and respiratory status closely.  Continuous pulse ox    Diabetes mellitus (HCC)- (present on admission)  Assessment & Plan  Insulin sliding scale, diabetic diet, POC glucose checks    Anemia- (present on admission)  Assessment & Plan  No reports of hemorrhage, suspect secondary to chronic renal disease.  Continue to monitor and transfuse for hemoglobin less than 7.    Leukocytosis- (present on admission)  Assessment & Plan  I suspect secondary to steroid administration for COPD.  She has been receiving 40 mg Solu-Medrol every 8 hours, no longer wheezing and on room air.  No infectious symptoms or complaints.  Chest x-ray without evidence of infection, no dysuria, no abdominal pain or diarrhea, no concerning skin lesions, no dental pain or sinus pain.  Continue to monitor, check UA.    Primary hypertension- (present on admission)  Assessment & Plan  Presented to OSH with systolic blood pressure in the 200s, on Coreg and amlodipine at home, hydralazine has been added at OSH due to persistent hypertension.  Continue amlodipine, hydralazine, Coreg.  IV antihypertensives ordered with parameters.    Moderate aortic stenosis- (present on admission)  Assessment & Plan  Noted on TTE from OSH.  She is euvolemic, watch respiratory status and volume status closely.    Chronic heart failure with preserved ejection fraction (HCC)- (present on admission)  Assessment & Plan  Newly diagnosed HFpEF, TTE at OSH LVEF 65% PA pressure 60 mmHg grade 2 diastolic  dysfunction moderate aortic stenosis moderate aortic regurg.  She has been receiving Lasix with albumin daily for the past 3 days for evaluation of renal failure, she is euvolemic.  Holding further diuretics and fluids prior to discussing with nephrology team, patient is euvolemic and on room air, renal failure progressed with both fluids and diuresis at OSH.  Continue Coreg, statin  BNP on presentation at OSH 5700, troponin T 16    COPD (chronic obstructive pulmonary disease) (HCC)- (present on admission)  Assessment & Plan  Presented to OSH with acute exacerbation treated with 4 days of Solu-Medrol and doxycycline, was weaned down to room air on hospital day 1.  No wheeze, on room air, will not continue steroids or antibiotics at this time.  DuoNebs as needed, continuous pulse ox, RT consult      VTE prophylaxis: SCDs/TEDs and heparin ppx

## 2022-11-04 NOTE — PROGRESS NOTES
Pt arrived as direct admit from [place] via [facility] with MARIO. Pt A/A/O x 4. Denies pain or discomfort at this time. Pt able to ambulate to bathroom and then to bed. Gait steady. 4 eye skin check completed with charge CARL Rodríguez. Assessment completed. Call light and personal belongings within reach. Bed in low position with wheels locked.

## 2022-11-04 NOTE — PROGRESS NOTES
Pt oxygen decreased to 75% while up to bathroom. Pt placed on 3L via NC. Spo2 increased to 93%. Hospitalist notified. Will reassess need for oxygen in 1 hour per physician.

## 2022-11-04 NOTE — PROGRESS NOTES
Reassessed need for oxygen. Pt Spo2 decreased to 83% on room air at rest. Oxygen replaced on pt at 3L via NC and Spo2 increased to 90%. Physician notified.

## 2022-11-04 NOTE — ASSESSMENT & PLAN NOTE
11/5/2022 patient did have decreased sounds bilaterally and complaining of some mild dyspnea-patient started on low-dose prednisone 20 mg, last dose tomorrow  Continue oxygen needs patient will need O2 on discharge, will need outpatient follow-up with pulmonology  RT protocol

## 2022-11-04 NOTE — ASSESSMENT & PLAN NOTE
Estimated PA pressure 60 mmHg on recent echo, EF 65% grade 2 diastolic dysfunction moderate aortic stenosis moderate aortic regurg.  On room air, euvolemic.  Watch volume and respiratory status closely.  Continuous pulse ox

## 2022-11-04 NOTE — ASSESSMENT & PLAN NOTE
Unclear etiology, no recent renal toxic medications, no contrast, no episodes of hypotension.  Received fluid bolus while out OSH with worsening renal function followed by 3 days of daily IV lasix and albumin again with worsening renal function.  Renal US normal from OSH  Suspect progression of ESRD per nephrology  Renal dose meds and avoid nephrotoxins  Monitor I&O's  Follow renal function  Patient has been set up with outpatient HD  Continue PhosLo

## 2022-11-04 NOTE — ASSESSMENT & PLAN NOTE
No reports of hemorrhage, suspect secondary to chronic renal disease.  Continue to monitor and transfuse for hemoglobin less than 7.

## 2022-11-04 NOTE — CARE PLAN
The patient is Watcher - Medium risk of patient condition declining or worsening    Shift Goals  Clinical Goals: Monitor I/Os, Education  Patient Goals: Rest    Progress made toward(s) clinical / shift goals:    Problem: Respiratory  Goal: Patient will achieve/maintain optimum respiratory ventilation and gas exchange  11/4/2022 1620 by Karen Ashraf, R.N.  Outcome: Not Progressing  11/4/2022 1619 by Karen Ashraf, R.N.  Outcome: Progressing       Patient is not progressing towards the following goals:Pt requiring 3L via NC to maintain spo2 greater than 90% this shift.       Problem: Respiratory  Goal: Patient will achieve/maintain optimum respiratory ventilation and gas exchange  11/4/2022 1620 by Karen Ashraf, R.N.  Outcome: Not Progressing  11/4/2022 1619 by Karen Ashraf, R.N.  Outcome: Progressing

## 2022-11-04 NOTE — CARE PLAN
The patient is Stable - Low risk of patient condition declining or worsening    Shift Goals  Clinical Goals: Monitor I/Os, Education  Patient Goals: Rest,Comfort    Progress made toward(s) clinical / shift goals:      Problem: Knowledge Deficit - Standard  Goal: Patient and family/care givers will demonstrate understanding of plan of care, disease process/condition, diagnostic tests and medications  Outcome: Progressing     Problem: Psychosocial Needs:  Goal: Ability to cope will improve  Outcome: Progressing     Problem: Urinary Elimination:  Goal: Ability to achieve and maintain adequate renal perfusion and functioning will improve  Outcome: Progressing     Problem: Physical Regulation:  Goal: Diagnostic test results will improve  Outcome: Progressing     Problem: Knowledge Deficit:  Goal: Patient's knowledge of the prescribed therapeutic regimen will improve  Outcome: Progressing       Patient is not progressing towards the following goals:

## 2022-11-04 NOTE — CARE PLAN
The patient is Stable - Low risk of patient condition declining or worsening    Shift Goals  Clinical Goals: Monitor I/Os  Patient Goals: Rest    Progress made toward(s) clinical / shift goals:    Problem: Knowledge Deficit - Standard  Goal: Patient and family/care givers will demonstrate understanding of plan of care, disease process/condition, diagnostic tests and medications  Outcome: Progressing     Problem: Psychosocial Needs:  Goal: Ability to cope will improve  Outcome: Progressing     Problem: Urinary Elimination:  Goal: Ability to achieve and maintain adequate renal perfusion and functioning will improve  Outcome: Progressing  Goal: Ability to achieve a balanced intake and output will improve  Outcome: Progressing     Problem: Physical Regulation:  Goal: Diagnostic test results will improve  Outcome: Progressing     Problem: Knowledge Deficit:  Goal: Patient's knowledge of the prescribed therapeutic regimen will improve  Outcome: Progressing       Patient is not progressing towards the following goals:

## 2022-11-04 NOTE — ASSESSMENT & PLAN NOTE
Presented to OSH with systolic blood pressure in the 200s, on Coreg and amlodipine at home, hydralazine has been added at OSH due to persistent hypertension.  Continue amlodipine, hydralazine, Coreg.  IV antihypertensives ordered with parameters.

## 2022-11-04 NOTE — PROGRESS NOTES
4 Eyes Skin Assessment Completed by CARL Jones and CARL Rodríguez.    Head WDL  Ears WDL  Nose WDL  Mouth WDL  Neck WDL  Breast/Chest WDL  Shoulder Blades WDL  Spine WDL  (R) Arm/Elbow/Hand Bruising  (L) Arm/Elbow/Hand Bruising  Abdomen WDL  Groin WDL  Scrotum/Coccyx/Buttocks WDL  (R) Leg WDL  (L) Leg WDL  (R) Heel/Foot/Toe WDL  (L) Heel/Foot/Toe WDL          Devices In Places Blood Pressure Cuff      Interventions In Place Pressure Redistribution Mattress    Possible Skin Injury No    Pictures Uploaded Into Epic N/A  Wound Consult Placed N/A  RN Wound Prevention Protocol Ordered No

## 2022-11-04 NOTE — CONSULTS
[facility] -  CONSULTATION NOTE               Author: Cristofer Joseph M.D. Date & Time: 11/4/2022  8:28 AM       REASON FOR CONSULTATION:   SEN    CHIEF COMPLAINT:   My kidneys are failing    HISTORY OF PRESENT ILLNESS:    Ms. Galloway is a 73 yo F who presented to OSH on 10/30/2022 with chief complaint of cough and shortness of breath. Cough and shortness of breath been ongoing since starting nifedipine 3 weeks prior, medication was stopped however symptoms persisted. She had a productive cough no fevers wheezing orthopnea. Denies chest pain fever sick contacts urinary symptoms vomiting diarrhea abdominal pain. She is a former smoker with COPD not on on home oxygen therapy. She has also has h/o hypertension, CKD stage IV, and diabetes mellitus.  When she initially presented her Serum creatinine was 3.83. On hospital day 2 creatinine worsened to 4.7, noted to have good urine output.  She was given 1 L bolus repeat creatinine and labs showed worsening creatinine up to 4.9.  She was given Lasix 40 mg IV daily with albumin 25 g IV (ultimately for 3 days with progression of renal failure).  On hospital day 3 creatinine elevated to 5, hospital day 4 creatinine 5.3 serum bicarb 18 urine output 725 cc/in prior 24 hours. [place] [facility] [facility] for progressive renal failure. Nephrology was asked to consult. She does have some Nausea but no vomiting. Her baseline scr is ~ 3-3.5.    Echocardiogram obtained  from OSH showed EF 65% PA pressure 60 mmHg, grade 2 diastolic dysfunction, moderate aortic stenosis, moderate aortic regurgitation.  She has been receiving methylprednisolone 40 mg every 8 hours.  Renal ultrasound from OSH shows no abnormalities.     REVIEW OF SYSTEMS:    Constitutional:  Negative for chills and fever.   HENT:  Negative for congestion and sinus pain.    Eyes:  Negative for blurred vision and double vision.   Respiratory:  Positive for cough,  "sputum production, shortness of breath and wheezing.    Cardiovascular:  Negative for chest pain and palpitations.   Gastrointestinal:  Negative for abdominal pain, constipation, diarrhea, nausea and vomiting.   Genitourinary:  Negative for dysuria, frequency and urgency.   Musculoskeletal:  Negative for falls and joint pain.   Neurological:  Negative for sensory change, speech change and focal weakness.   Psychiatric/Behavioral:  Negative for substance abuse. The patient is not nervous/anxious.      PAST MEDICAL HISTORY:   Past Medical History:   Diagnosis Date    COPD (chronic obstructive pulmonary disease) (HCC)     Heart murmur     Hypertension     Renal disorder        PAST SURGICAL HISTORY:   History reviewed. No pertinent surgical history.    FAMILY HISTORY:   History reviewed. No pertinent family history.    SOCIAL HISTORY:   Social History     Tobacco Use   Smoking Status Former    Types: Cigarettes    Quit date: 10/1/2014    Years since quittin.0   Smokeless Tobacco Never     Social History     Substance and Sexual Activity   Alcohol Use None     Social History     Substance and Sexual Activity   Drug Use Not on file       HOME MEDICATIONS:   Reviewed and documented in chart    LABORATORY STUDIES:   Recent Labs     22   SODIUM 138  139   POTASSIUM 4.0  4.1   CHLORIDE 97  98   CO2 21  21   GLUCOSE 180*  183*   BUN 94*  94*   CREATININE 5.82*  5.74*   CALCIUM 8.5  8.4*       ALLERGIES:  Codeine and Hydrocodone    VS:  BP (!) 143/50   Pulse 75   Temp 36.8 °C (98.3 °F) (Temporal)   Resp 16   Ht 1.575 m (5' 2\")   Wt 49.3 kg (108 lb 11 oz)   SpO2 90%   BMI 19.88 kg/m²     Physical Exam  Vitals and nursing note reviewed.   Constitutional:       General: She is not in acute distress.     Appearance: Normal appearance. She is not ill-appearing.   HENT:      Head: Normocephalic.      Nose: Nose normal.      Mouth/Throat:      Mouth: Mucous membranes are moist.      Pharynx: Oropharynx " is clear.   Eyes:      Extraocular Movements: Extraocular movements intact.      Conjunctiva/sclera: Conjunctivae normal.      Pupils: Pupils are equal, round, and reactive to light.   Cardiovascular:      Rate and Rhythm: Normal rate.   Pulmonary:      Effort: Pulmonary effort is normal. No respiratory distress.      Breath sounds: Normal breath sounds. No stridor. No wheezing, rhonchi or rales.   Abdominal:      General: Abdomen is flat. Bowel sounds are normal.      Palpations: Abdomen is soft.   Musculoskeletal:         General: Normal range of motion.      Cervical back: Normal range of motion.   Skin:     General: Skin is warm and dry.   Neurological:      General: No focal deficit present.      Mental Status: She is alert and oriented to person, place, and time. Mental status is at baseline.   Psychiatric:         Mood and Affect: Mood normal.         Thought Content: Thought content normal.         Judgment: Judgment normal.       FLUID BALANCE:  In: -   Out: 150     IMAGING:  All imaging reviewed from admission to present day    IMPRESSION:  # SEN on CKD Stage IV. Etiology likely overdiuresis  # CKD stage IV  # DM  # Anemia  # HTN  # Moderate Aortic stenosis  # h/o CHF with preserved EF  # COPD  # Pulm HTN    PLAN:  - No compelling indication for RRT  - Daily evaluation for RRT needs  - Dose all meds per eGFR < 15  - Hold diuretics for now  - Keep NPO after midnight  - Discussed with patient that if there is no improvement in Scr in the next 24 hrs then likely proceed with Dialysis. She is in agreement.    Thank you for the consultation!

## 2022-11-05 ENCOUNTER — ANESTHESIA (OUTPATIENT)
Dept: SURGERY | Facility: MEDICAL CENTER | Age: 72
DRG: 674 | End: 2022-11-05
Payer: MEDICARE

## 2022-11-05 ENCOUNTER — ANESTHESIA EVENT (OUTPATIENT)
Dept: SURGERY | Facility: MEDICAL CENTER | Age: 72
DRG: 674 | End: 2022-11-05
Payer: MEDICARE

## 2022-11-05 ENCOUNTER — APPOINTMENT (OUTPATIENT)
Dept: RADIOLOGY | Facility: MEDICAL CENTER | Age: 72
DRG: 674 | End: 2022-11-05
Attending: SURGERY
Payer: MEDICARE

## 2022-11-05 LAB
ANION GAP SERPL CALC-SCNC: 17 MMOL/L (ref 7–16)
BUN SERPL-MCNC: 99 MG/DL (ref 8–22)
CALCIUM SERPL-MCNC: 8.1 MG/DL (ref 8.5–10.5)
CALCIUM SERPL-MCNC: 8.4 MG/DL (ref 8.5–10.5)
CHLORIDE SERPL-SCNC: 99 MMOL/L (ref 96–112)
CO2 SERPL-SCNC: 21 MMOL/L (ref 20–33)
CREAT SERPL-MCNC: 5.71 MG/DL (ref 0.5–1.4)
ERYTHROCYTE [DISTWIDTH] IN BLOOD BY AUTOMATED COUNT: 42 FL (ref 35.9–50)
FERRITIN SERPL-MCNC: 401 NG/ML (ref 10–291)
GFR SERPLBLD CREATININE-BSD FMLA CKD-EPI: 7 ML/MIN/1.73 M 2
GLUCOSE BLD STRIP.AUTO-MCNC: 113 MG/DL (ref 65–99)
GLUCOSE BLD STRIP.AUTO-MCNC: 114 MG/DL (ref 65–99)
GLUCOSE BLD STRIP.AUTO-MCNC: 143 MG/DL (ref 65–99)
GLUCOSE SERPL-MCNC: 106 MG/DL (ref 65–99)
HAV IGM SERPL QL IA: NORMAL
HBV CORE IGM SER QL: NORMAL
HBV SURFACE AB SERPL IA-ACNC: <3.5 MIU/ML (ref 0–10)
HBV SURFACE AG SER QL: NORMAL
HCT VFR BLD AUTO: 33.4 % (ref 37–47)
HCV AB SER QL: NORMAL
HGB BLD-MCNC: 10.5 G/DL (ref 12–16)
IRON SATN MFR SERPL: 90 % (ref 15–55)
IRON SERPL-MCNC: 206 UG/DL (ref 40–170)
MCH RBC QN AUTO: 29.2 PG (ref 27–33)
MCHC RBC AUTO-ENTMCNC: 31.4 G/DL (ref 33.6–35)
MCV RBC AUTO: 93 FL (ref 81.4–97.8)
PLATELET # BLD AUTO: 262 K/UL (ref 164–446)
PMV BLD AUTO: 10.1 FL (ref 9–12.9)
POTASSIUM SERPL-SCNC: 4 MMOL/L (ref 3.6–5.5)
PTH-INTACT SERPL-MCNC: 183 PG/ML (ref 14–72)
RBC # BLD AUTO: 3.59 M/UL (ref 4.2–5.4)
SODIUM SERPL-SCNC: 137 MMOL/L (ref 135–145)
TIBC SERPL-MCNC: 230 UG/DL (ref 250–450)
UIBC SERPL-MCNC: 24 UG/DL (ref 110–370)
WBC # BLD AUTO: 8.3 K/UL (ref 4.8–10.8)

## 2022-11-05 PROCEDURE — 36558 INSERT TUNNELED CV CATH: CPT | Mod: RT | Performed by: SURGERY

## 2022-11-05 PROCEDURE — 82962 GLUCOSE BLOOD TEST: CPT | Mod: 91

## 2022-11-05 PROCEDURE — 86480 TB TEST CELL IMMUN MEASURE: CPT

## 2022-11-05 PROCEDURE — 80074 ACUTE HEPATITIS PANEL: CPT

## 2022-11-05 PROCEDURE — 0JH63XZ INSERTION OF TUNNELED VASCULAR ACCESS DEVICE INTO CHEST SUBCUTANEOUS TISSUE AND FASCIA, PERCUTANEOUS APPROACH: ICD-10-PCS | Performed by: SURGERY

## 2022-11-05 PROCEDURE — 160039 HCHG SURGERY MINUTES - EA ADDL 1 MIN LEVEL 3: Performed by: SURGERY

## 2022-11-05 PROCEDURE — 99100 ANES PT EXTEME AGE<1 YR&>70: CPT | Performed by: ANESTHESIOLOGY

## 2022-11-05 PROCEDURE — 160009 HCHG ANES TIME/MIN: Performed by: SURGERY

## 2022-11-05 PROCEDURE — 86706 HEP B SURFACE ANTIBODY: CPT

## 2022-11-05 PROCEDURE — 031C0AF BYPASS LEFT RADIAL ARTERY TO LOWER ARM VEIN WITH AUTOLOGOUS ARTERIAL TISSUE, OPEN APPROACH: ICD-10-PCS | Performed by: SURGERY

## 2022-11-05 PROCEDURE — 85027 COMPLETE CBC AUTOMATED: CPT

## 2022-11-05 PROCEDURE — C1750 CATH, HEMODIALYSIS,LONG-TERM: HCPCS | Performed by: SURGERY

## 2022-11-05 PROCEDURE — 82728 ASSAY OF FERRITIN: CPT

## 2022-11-05 PROCEDURE — 36821 AV FUSION DIRECT ANY SITE: CPT | Performed by: SURGERY

## 2022-11-05 PROCEDURE — 99232 SBSQ HOSP IP/OBS MODERATE 35: CPT | Performed by: HOSPITALIST

## 2022-11-05 PROCEDURE — 01844 ANES VASC SHUNT/SHUNT REVJ: CPT | Performed by: ANESTHESIOLOGY

## 2022-11-05 PROCEDURE — 90935 HEMODIALYSIS ONE EVALUATION: CPT

## 2022-11-05 PROCEDURE — 700102 HCHG RX REV CODE 250 W/ 637 OVERRIDE(OP): Performed by: STUDENT IN AN ORGANIZED HEALTH CARE EDUCATION/TRAINING PROGRAM

## 2022-11-05 PROCEDURE — 160002 HCHG RECOVERY MINUTES (STAT): Performed by: SURGERY

## 2022-11-05 PROCEDURE — 83540 ASSAY OF IRON: CPT

## 2022-11-05 PROCEDURE — 5A1D70Z PERFORMANCE OF URINARY FILTRATION, INTERMITTENT, LESS THAN 6 HOURS PER DAY: ICD-10-PCS | Performed by: INTERNAL MEDICINE

## 2022-11-05 PROCEDURE — 700111 HCHG RX REV CODE 636 W/ 250 OVERRIDE (IP): Performed by: HOSPITALIST

## 2022-11-05 PROCEDURE — 700101 HCHG RX REV CODE 250: Performed by: ANESTHESIOLOGY

## 2022-11-05 PROCEDURE — 160048 HCHG OR STATISTICAL LEVEL 1-5: Performed by: SURGERY

## 2022-11-05 PROCEDURE — 02HV33Z INSERTION OF INFUSION DEVICE INTO SUPERIOR VENA CAVA, PERCUTANEOUS APPROACH: ICD-10-PCS | Performed by: SURGERY

## 2022-11-05 PROCEDURE — 770006 HCHG ROOM/CARE - MED/SURG/GYN SEMI*

## 2022-11-05 PROCEDURE — 700101 HCHG RX REV CODE 250: Performed by: SURGERY

## 2022-11-05 PROCEDURE — A9270 NON-COVERED ITEM OR SERVICE: HCPCS | Performed by: STUDENT IN AN ORGANIZED HEALTH CARE EDUCATION/TRAINING PROGRAM

## 2022-11-05 PROCEDURE — 700111 HCHG RX REV CODE 636 W/ 250 OVERRIDE (IP): Performed by: ANESTHESIOLOGY

## 2022-11-05 PROCEDURE — 36415 COLL VENOUS BLD VENIPUNCTURE: CPT

## 2022-11-05 PROCEDURE — 99221 1ST HOSP IP/OBS SF/LOW 40: CPT | Mod: 57 | Performed by: SURGERY

## 2022-11-05 PROCEDURE — 160035 HCHG PACU - 1ST 60 MINS PHASE I: Performed by: SURGERY

## 2022-11-05 PROCEDURE — 700111 HCHG RX REV CODE 636 W/ 250 OVERRIDE (IP)

## 2022-11-05 PROCEDURE — 700111 HCHG RX REV CODE 636 W/ 250 OVERRIDE (IP): Performed by: SURGERY

## 2022-11-05 PROCEDURE — 700105 HCHG RX REV CODE 258: Performed by: ANESTHESIOLOGY

## 2022-11-05 PROCEDURE — 80048 BASIC METABOLIC PNL TOTAL CA: CPT

## 2022-11-05 PROCEDURE — 83970 ASSAY OF PARATHORMONE: CPT

## 2022-11-05 PROCEDURE — 76937 US GUIDE VASCULAR ACCESS: CPT | Mod: 26 | Performed by: SURGERY

## 2022-11-05 PROCEDURE — 83550 IRON BINDING TEST: CPT

## 2022-11-05 PROCEDURE — 160028 HCHG SURGERY MINUTES - 1ST 30 MINS LEVEL 3: Performed by: SURGERY

## 2022-11-05 DEVICE — CATHETER HEMOSPLIT 19CM (5EA/CA): Type: IMPLANTABLE DEVICE | Site: CHEST | Status: FUNCTIONAL

## 2022-11-05 RX ORDER — HEPARIN SODIUM 1000 [USP'U]/ML
INJECTION, SOLUTION INTRAVENOUS; SUBCUTANEOUS PRN
Status: DISCONTINUED | OUTPATIENT
Start: 2022-11-05 | End: 2022-11-05 | Stop reason: SURG

## 2022-11-05 RX ORDER — LABETALOL HYDROCHLORIDE 5 MG/ML
5 INJECTION, SOLUTION INTRAVENOUS
Status: DISCONTINUED | OUTPATIENT
Start: 2022-11-05 | End: 2022-11-05 | Stop reason: HOSPADM

## 2022-11-05 RX ORDER — DIPHENHYDRAMINE HYDROCHLORIDE 50 MG/ML
12.5 INJECTION INTRAMUSCULAR; INTRAVENOUS
Status: DISCONTINUED | OUTPATIENT
Start: 2022-11-05 | End: 2022-11-05 | Stop reason: HOSPADM

## 2022-11-05 RX ORDER — PREDNISONE 20 MG/1
20 TABLET ORAL DAILY
Status: DISPENSED | OUTPATIENT
Start: 2022-11-05 | End: 2022-11-09

## 2022-11-05 RX ORDER — OXYCODONE HCL 5 MG/5 ML
10 SOLUTION, ORAL ORAL
Status: DISCONTINUED | OUTPATIENT
Start: 2022-11-05 | End: 2022-11-05 | Stop reason: HOSPADM

## 2022-11-05 RX ORDER — HYDRALAZINE HYDROCHLORIDE 20 MG/ML
5 INJECTION INTRAMUSCULAR; INTRAVENOUS
Status: DISCONTINUED | OUTPATIENT
Start: 2022-11-05 | End: 2022-11-05 | Stop reason: HOSPADM

## 2022-11-05 RX ORDER — HEPARIN SODIUM,PORCINE 1000/ML
VIAL (ML) INJECTION
Status: DISCONTINUED | OUTPATIENT
Start: 2022-11-05 | End: 2022-11-05 | Stop reason: HOSPADM

## 2022-11-05 RX ORDER — HYDROMORPHONE HYDROCHLORIDE 1 MG/ML
0.4 INJECTION, SOLUTION INTRAMUSCULAR; INTRAVENOUS; SUBCUTANEOUS
Status: DISCONTINUED | OUTPATIENT
Start: 2022-11-05 | End: 2022-11-05 | Stop reason: HOSPADM

## 2022-11-05 RX ORDER — HEPARIN SODIUM 1000 [USP'U]/ML
3300 INJECTION, SOLUTION INTRAVENOUS; SUBCUTANEOUS
Status: DISCONTINUED | OUTPATIENT
Start: 2022-11-05 | End: 2022-11-09 | Stop reason: HOSPADM

## 2022-11-05 RX ORDER — ONDANSETRON 2 MG/ML
INJECTION INTRAMUSCULAR; INTRAVENOUS PRN
Status: DISCONTINUED | OUTPATIENT
Start: 2022-11-05 | End: 2022-11-05 | Stop reason: SURG

## 2022-11-05 RX ORDER — LIDOCAINE HYDROCHLORIDE 20 MG/ML
INJECTION, SOLUTION EPIDURAL; INFILTRATION; INTRACAUDAL; PERINEURAL PRN
Status: DISCONTINUED | OUTPATIENT
Start: 2022-11-05 | End: 2022-11-05 | Stop reason: SURG

## 2022-11-05 RX ORDER — BUPIVACAINE HYDROCHLORIDE 5 MG/ML
INJECTION, SOLUTION EPIDURAL; INTRACAUDAL
Status: DISCONTINUED | OUTPATIENT
Start: 2022-11-05 | End: 2022-11-05 | Stop reason: HOSPADM

## 2022-11-05 RX ORDER — HEPARIN SODIUM 1000 [USP'U]/ML
INJECTION, SOLUTION INTRAVENOUS; SUBCUTANEOUS
Status: COMPLETED
Start: 2022-11-05 | End: 2022-11-05

## 2022-11-05 RX ORDER — HALOPERIDOL 5 MG/ML
1 INJECTION INTRAMUSCULAR
Status: DISCONTINUED | OUTPATIENT
Start: 2022-11-05 | End: 2022-11-05 | Stop reason: HOSPADM

## 2022-11-05 RX ORDER — CEFAZOLIN SODIUM 1 G/3ML
INJECTION, POWDER, FOR SOLUTION INTRAMUSCULAR; INTRAVENOUS PRN
Status: DISCONTINUED | OUTPATIENT
Start: 2022-11-05 | End: 2022-11-05 | Stop reason: SURG

## 2022-11-05 RX ORDER — HYDROMORPHONE HYDROCHLORIDE 1 MG/ML
0.2 INJECTION, SOLUTION INTRAMUSCULAR; INTRAVENOUS; SUBCUTANEOUS
Status: DISCONTINUED | OUTPATIENT
Start: 2022-11-05 | End: 2022-11-05 | Stop reason: HOSPADM

## 2022-11-05 RX ORDER — METOCLOPRAMIDE HYDROCHLORIDE 5 MG/ML
INJECTION INTRAMUSCULAR; INTRAVENOUS PRN
Status: DISCONTINUED | OUTPATIENT
Start: 2022-11-05 | End: 2022-11-05 | Stop reason: SURG

## 2022-11-05 RX ORDER — SODIUM CHLORIDE, SODIUM LACTATE, POTASSIUM CHLORIDE, CALCIUM CHLORIDE 600; 310; 30; 20 MG/100ML; MG/100ML; MG/100ML; MG/100ML
INJECTION, SOLUTION INTRAVENOUS CONTINUOUS
Status: DISCONTINUED | OUTPATIENT
Start: 2022-11-05 | End: 2022-11-05 | Stop reason: HOSPADM

## 2022-11-05 RX ORDER — OXYCODONE HCL 5 MG/5 ML
5 SOLUTION, ORAL ORAL
Status: DISCONTINUED | OUTPATIENT
Start: 2022-11-05 | End: 2022-11-05 | Stop reason: HOSPADM

## 2022-11-05 RX ORDER — PROTAMINE SULFATE 10 MG/ML
INJECTION, SOLUTION INTRAVENOUS PRN
Status: DISCONTINUED | OUTPATIENT
Start: 2022-11-05 | End: 2022-11-05 | Stop reason: SURG

## 2022-11-05 RX ORDER — HYDROMORPHONE HYDROCHLORIDE 1 MG/ML
0.1 INJECTION, SOLUTION INTRAMUSCULAR; INTRAVENOUS; SUBCUTANEOUS
Status: DISCONTINUED | OUTPATIENT
Start: 2022-11-05 | End: 2022-11-05 | Stop reason: HOSPADM

## 2022-11-05 RX ORDER — ONDANSETRON 2 MG/ML
4 INJECTION INTRAMUSCULAR; INTRAVENOUS
Status: DISCONTINUED | OUTPATIENT
Start: 2022-11-05 | End: 2022-11-05 | Stop reason: HOSPADM

## 2022-11-05 RX ORDER — HYDROCODONE BITARTRATE AND ACETAMINOPHEN 5; 325 MG/1; MG/1
1-2 TABLET ORAL EVERY 4 HOURS PRN
Status: DISCONTINUED | OUTPATIENT
Start: 2022-11-05 | End: 2022-11-09 | Stop reason: HOSPADM

## 2022-11-05 RX ORDER — DEXAMETHASONE SODIUM PHOSPHATE 4 MG/ML
INJECTION, SOLUTION INTRA-ARTICULAR; INTRALESIONAL; INTRAMUSCULAR; INTRAVENOUS; SOFT TISSUE PRN
Status: DISCONTINUED | OUTPATIENT
Start: 2022-11-05 | End: 2022-11-05 | Stop reason: SURG

## 2022-11-05 RX ORDER — SODIUM CHLORIDE 9 MG/ML
INJECTION, SOLUTION INTRAVENOUS
Status: DISCONTINUED | OUTPATIENT
Start: 2022-11-05 | End: 2022-11-05 | Stop reason: SURG

## 2022-11-05 RX ADMIN — CEFAZOLIN 2 G: 330 INJECTION, POWDER, FOR SOLUTION INTRAMUSCULAR; INTRAVENOUS at 13:49

## 2022-11-05 RX ADMIN — FENTANYL CITRATE 50 MCG: 50 INJECTION, SOLUTION INTRAMUSCULAR; INTRAVENOUS at 14:00

## 2022-11-05 RX ADMIN — AMLODIPINE BESYLATE 10 MG: 10 TABLET ORAL at 06:08

## 2022-11-05 RX ADMIN — HEPARIN SODIUM 3300 UNITS: 1000 INJECTION, SOLUTION INTRAVENOUS; SUBCUTANEOUS at 19:07

## 2022-11-05 RX ADMIN — DEXAMETHASONE SODIUM PHOSPHATE 8 MG: 4 INJECTION, SOLUTION INTRA-ARTICULAR; INTRALESIONAL; INTRAMUSCULAR; INTRAVENOUS; SOFT TISSUE at 13:49

## 2022-11-05 RX ADMIN — SODIUM CHLORIDE: 9 INJECTION, SOLUTION INTRAVENOUS at 14:02

## 2022-11-05 RX ADMIN — PROTAMINE SULFATE 25 MG: 10 INJECTION, SOLUTION INTRAVENOUS at 15:08

## 2022-11-05 RX ADMIN — EZETIMIBE 10 MG: 10 TABLET ORAL at 06:08

## 2022-11-05 RX ADMIN — HYDRALAZINE HYDROCHLORIDE 100 MG: 50 TABLET, FILM COATED ORAL at 06:08

## 2022-11-05 RX ADMIN — CARVEDILOL 25 MG: 25 TABLET, FILM COATED ORAL at 08:30

## 2022-11-05 RX ADMIN — HEPARIN SODIUM 5000 UNITS: 1000 INJECTION, SOLUTION INTRAVENOUS; SUBCUTANEOUS at 14:30

## 2022-11-05 RX ADMIN — ONDANSETRON 4 MG: 2 INJECTION INTRAMUSCULAR; INTRAVENOUS at 15:15

## 2022-11-05 RX ADMIN — PAROXETINE HYDROCHLORIDE 10 MG: 10 TABLET, FILM COATED ORAL at 06:08

## 2022-11-05 RX ADMIN — PROPOFOL 100 MG: 10 INJECTION, EMULSION INTRAVENOUS at 13:47

## 2022-11-05 RX ADMIN — LIDOCAINE HYDROCHLORIDE 50 MG: 20 INJECTION, SOLUTION EPIDURAL; INFILTRATION; INTRACAUDAL at 13:47

## 2022-11-05 RX ADMIN — ONDANSETRON 4 MG: 2 INJECTION INTRAMUSCULAR; INTRAVENOUS at 12:43

## 2022-11-05 RX ADMIN — METOCLOPRAMIDE 10 MG: 5 INJECTION, SOLUTION INTRAMUSCULAR; INTRAVENOUS at 15:15

## 2022-11-05 ASSESSMENT — PAIN DESCRIPTION - PAIN TYPE
TYPE: SURGICAL PAIN
TYPE: ACUTE PAIN
TYPE: SURGICAL PAIN
TYPE: ACUTE PAIN
TYPE: SURGICAL PAIN

## 2022-11-05 ASSESSMENT — COPD QUESTIONNAIRES
DO YOU EVER COUGH UP ANY MUCUS OR PHLEGM?: NO/ONLY WITH OCCASIONAL COLDS OR INFECTIONS
DURING THE PAST 4 WEEKS HOW MUCH DID YOU FEEL SHORT OF BREATH: NONE/LITTLE OF THE TIME
HAVE YOU SMOKED AT LEAST 100 CIGARETTES IN YOUR ENTIRE LIFE: YES
COPD SCREENING SCORE: 5

## 2022-11-05 ASSESSMENT — ENCOUNTER SYMPTOMS
ABDOMINAL PAIN: 0
MYALGIAS: 0
COUGH: 0
DIZZINESS: 0
HEADACHES: 0
BLURRED VISION: 0
CHILLS: 0
SENSORY CHANGE: 0
TINGLING: 0
DEPRESSION: 0
HEMOPTYSIS: 0
PHOTOPHOBIA: 0
SPUTUM PRODUCTION: 0
TREMORS: 0
ORTHOPNEA: 0
CLAUDICATION: 0
BACK PAIN: 0
PALPITATIONS: 0
VOMITING: 0
DOUBLE VISION: 0
NAUSEA: 0
FEVER: 0
SHORTNESS OF BREATH: 1
HEARTBURN: 0

## 2022-11-05 ASSESSMENT — FIBROSIS 4 INDEX: FIB4 SCORE: 0.99

## 2022-11-05 ASSESSMENT — LIFESTYLE VARIABLES: SUBSTANCE_ABUSE: 0

## 2022-11-05 NOTE — RESPIRATORY CARE
"  COPD EDUCATION by COPD CLINICAL EDUCATOR  (Phone: [phone])  11/5/2022 at 11:36 AM by Jeanna Padilla, RRT     Patient was interviewed by Respiratory Education team for COPD program. Patient refused COPD program. Information packet about lung disease, its treatments and \"Stop Smoking\" information given to patient.  She quit smoking 8 years ago. Her PCP manages her lung disease. Encouraged discussion with her PCP regarding Pulmonary Function Testing to define her COPD    COPD Screen  COPD Risk Screening  Do you have a history of COPD?: Yes  Do you have a Pulmonologist?: No  COPD Population Screener  During the past 4 weeks, how much did you feel short of breath?: None/Little of the time  Do you ever cough up any mucus or phlegm?: No/only with occasional colds or infections  In the past 12 months, you do less than you used to because of your breathing problems: Agree  Have you smoked at least 100 cigarettes in your entire life?: Yes  How old are you?: 60+  COPD Screening Score: 5    COPD Assessment  COPD Clinical Specialists ONLY  COPD Education Initiated: Yes--Short Intervention (transfer from [place] was COPD ex and now resolved has AKF being worked up for dialysis declines education)  DME Company: none  Is this a COPD exacerbation patient?: No  $ Demo/Eval of SVN's, MDI's and Aerosols:  (declined)    PFT Results  No results found for: PFT    Meds to Beds  Would the patient like to opt in for Bedside Medication Delivery at Discharge?: Yes, interested     MY COPD ACTION PLAN     It is recommended that patients and physicians /healthcare providers complete this action plan together. This plan should be discussed at each physician visit and updated as needed.    The green, yellow and red zones show groups of symptoms of COPD. This list of symptoms is not comprehensive, and you may experience other symptoms. In the \"Actions\" column, your healthcare provider has recommended actions for you to take based on your " "symptoms.    Patient Name: Jesi Galloway   YOB: 1950   Last Updated on:     Green Zone:  I am doing well today Actions     Usual activitiy and exercise level   Take daily medications     Usual amounts of cough and phlegm/mucus   Use oxygen as prescribed     Sleep well at night   Continue regular exercise/diet plan     Appetite is good   At all times avoid cigarette smoke, inhaled irritants     Daily Medications (these medications are taken every day):                Yellow Zone:  I am having a bad day or a COPD flare Actions     More breathless than usual   Continue daily medications     I have less energy for my daily activities   Use quick relief inhaler as ordered     Increased or thicker phlegm/mucus   Use oxygen as prescribed     Using quick relief inhaler/nebulizer more often   Get plenty of rest     Swelling of ankles more than usual   Use pursed lip breathing     More coughing than usual   At all times avoid cigarette smoke, inhaled irritants     I feel like I have a \"chest cold\"     Poor sleep and my symptoms woke me up     My appetite is not good     My medicine is not helping      Call provider immediately if symptoms don’t improve     Continue daily medications, add rescue medications:   Albuterol 2 Puffs Every 4 hours PRN       Medications to be used during a flare up, (as Discussed with Provider):              Red Zone:  I need urgent medical care Actions     Severe shortness of breath even at rest   Call 911 or seek medical care immediately     Not able to do any activity because of breathing      Fever or shaking chills      Feeling confused or very drowsy       Chest pains      Coughing up blood                  "

## 2022-11-05 NOTE — OP REPORT
VASCULAR SURGERY SERVICE  Operative Note  __________________________________________________________    Date:  2022    Patient: Jesi Galloway  :  1950  MRN:  [id number]  __________________________________________________________    Preoperative Diagnosis:  - End-stage renal disease    Postoperative Diagnosis:  - End-stage renal disease    Procedure:  81333:  Creation of a left radiocephalic AV fistula  34147 and 61086: Percutaneous access of the right IJ vein with ultrasound guidance and insertion of a right IJ tunneled cuffed dual lumen dialysis catheter  __________________________________________________________    Surgeon:                                 Ronnie Hahn MD    Assistant:   None    Anesthesia:                             General plus local anesthetic    EBL:                                        minimal     Complications:                        none    Disposition:                             Tolerated well, sent to recovery in stable condition    __________________________________________________________       DESCRIPTION OF PROCEDURE:  Following informed consent, patient was placed supine on the operating table and general anesthesia was administered. Patient was prepped and draped in the usual sterile fashion.  Surgical time-out was called and correct.  Patient received preoperative antibiotics.       Local anesthetic was infiltrated over the radial artery and the nearby cephalic vein.  I then made a transverse incision overlying the vein and artery. The vein was dissected out circumferentially for a length of approximately 3 cm.  I then dissected further to identify the radial artery, this was dissected out for a length of approximately 2 cm and it was encircled with vessel loops proximally and distally.  The patient was then systemically heparinized.  The cephalic vein was transected and dilated with vessel dilators and a 3 mm vessel dilator was found to pass easily. A  longitudinal arteriotomy was made in the artery.  The artery was antegrade flushed and then backbled and it was found to have brisk bleeding in both directions.   At this point, a guerrero was created on the vein and it was anastomosed to the artery using a running 6-0 Prolene suture line.  The artery was backbled and forward flushed prior to completing the anastomosis and the vein was also backbled as well.  The lumen was irrigated with heparinized saline.  The anastomosis was completed and the vein was allowed to perfuse and was found to have a good thrill.  The distal artery was then opened and it was found to have a brisk Doppler signal.  Topical hemostatic was applied and the patient's heparin was reversed with protamine. The incision was closed with multiple layers of absorbable suture and a dressing was applied.    Local anesthetic was infiltrated over the target vein.  Ultrasound was used to access the IJ vein and the wire passed easily.  A stab incision was made at the insertion point and a counter incision was made on the chest.  The new catheter was tunneled from the chest insertion point up to the neck incision.  The venotomy was serially dilated and then the catheter was placed through the peel-away sheath down into the vena cava and correct position was verified with fluoroscopy.  The catheter aspirated and flushed well. It was anchored in place with nylon suture.  It was flushed and then locked with concentrated heparin and then a bandage was applied as well as caps on the catheter. The skin incision at the base of the neck was closed with subcuticular absorbable suture and bandaged.         All counts were correct.  Patient tolerated the procedure well and was sent to recovery in stable condition.     ____________________________________________________    Ronnie Hahn MD  [facility]

## 2022-11-05 NOTE — PROGRESS NOTES
[facility] -  PROGRESS NOTE               Author: Cristofer Joseph M.D. Date & Time: 11/5/2022  10:37 AM     HPI:  Ms. Galloway is a 73 yo F who presented to OSH on 10/30/2022 with chief complaint of cough and shortness of breath. Cough and shortness of breath been ongoing since starting nifedipine 3 weeks prior, medication was stopped however symptoms persisted. She had a productive cough no fevers wheezing orthopnea. Denies chest pain fever sick contacts urinary symptoms vomiting diarrhea abdominal pain. She is a former smoker with COPD not on on home oxygen therapy. She has also has h/o hypertension, CKD stage IV, and diabetes mellitus.  When she initially presented her Serum creatinine was 3.83. On hospital day 2 creatinine worsened to 4.7, noted to have good urine output.  She was given 1 L bolus repeat creatinine and labs showed worsening creatinine up to 4.9.  She was given Lasix 40 mg IV daily with albumin 25 g IV (ultimately for 3 days with progression of renal failure).  On hospital day 3 creatinine elevated to 5, hospital day 4 creatinine 5.3 serum bicarb 18 urine output 725 cc/in prior 24 hours. She was [place] to [facility] [place]22 from [facility] for progressive renal failure. Nephrology was asked to consult. She does have some Nausea but no vomiting. Her baseline scr is ~ 3-3.5.     Echocardiogram obtained  from OSH showed EF 65% PA pressure 60 mmHg, grade 2 diastolic dysfunction, moderate aortic stenosis, moderate aortic regurgitation.  She has been receiving methylprednisolone 40 mg every 8 hours.  Renal ultrasound from OSH shows no abnormalities.     DAILY NEPHROLOGY SUMMARY:  11/05 - Scr unchanged from yesterday. Non-oliguric. Feels worse than yesterday.    REVIEW OF SYSTEMS:    10 point ROS reviewed and is as per HPI/daily summary or otherwise negative    PMH/PSH/SH/FH:   Reviewed and unchanged since admission note    CURRENT MEDICATIONS:   Reviewed from admission to  "present day    VS:  BP (!) 160/61 Comment: Rn aware  Pulse 74   Temp 36.9 °C (98.5 °F) (Temporal)   Resp 17   Ht 1.575 m (5' 2\")   Wt 51.5 kg (113 lb 8.6 oz)   SpO2 94%   BMI 20.77 kg/m²     Physical Exam  Constitutional:       Appearance: Normal appearance.   HENT:      Head: Normocephalic and atraumatic.      Nose: Nose normal.      Mouth/Throat:      Mouth: Mucous membranes are moist.      Pharynx: Oropharynx is clear.   Eyes:      Extraocular Movements: Extraocular movements intact.      Conjunctiva/sclera: Conjunctivae normal.      Pupils: Pupils are equal, round, and reactive to light.   Cardiovascular:      Rate and Rhythm: Normal rate and regular rhythm.      Pulses: Normal pulses.      Heart sounds: Normal heart sounds.   Pulmonary:      Effort: Pulmonary effort is normal.      Breath sounds: Normal breath sounds.   Abdominal:      General: Abdomen is flat. Bowel sounds are normal.      Palpations: Abdomen is soft.   Musculoskeletal:         General: Normal range of motion.      Cervical back: Normal range of motion.   Skin:     General: Skin is warm.      Capillary Refill: Capillary refill takes less than 2 seconds.   Neurological:      General: No focal deficit present.      Mental Status: She is alert and oriented to person, place, and time. Mental status is at baseline.   Psychiatric:         Mood and Affect: Mood normal.         Behavior: Behavior normal.         Thought Content: Thought content normal.         Judgment: Judgment normal.       Fluids:  In: 580 [P.O.:580]  Out: 1800     LABS:  Recent Labs     11/03/22 2007 11/05/22  0104   SODIUM 138  139 137   POTASSIUM 4.0  4.1 4.0   CHLORIDE 97  98 99   CO2 21  21 21   GLUCOSE 180*  183* 106*   BUN 94*  94* 99*   CREATININE 5.82*  5.74* 5.71*   CALCIUM 8.5  8.4* 8.1*       IMAGING:   All imaging reviewed from admission to present day    IMPRESSION:  # Likely progressive CKD -  now ESRD. + Uremic  # CKD stage IV  # DM  # Anemia  # " HTN  # Moderate Aortic stenosis  # h/o CHF with preserved EF  # COPD  # Pulm HTN     PLAN:  - Will need to initiate HD. Will plan for catheter today and dialysis.  - Daily evaluation for RRT needs  - Dose all meds per eGFR < 15  - Hold diuretics for now  - Check Quant Gold and Hepatitis  - Iron panel  - PTH  - Continue phos binder

## 2022-11-05 NOTE — PROGRESS NOTES
Assumed pt care. Pt sleeping. Pt on 3L NC. Bed locked, bed in lowest position and call light within reach.

## 2022-11-05 NOTE — CARE PLAN
The patient is Watcher - Medium risk of patient condition declining or worsening    Shift Goals  Clinical Goals: Titrate O2  Patient Goals: sleep    Progress made toward(s) clinical / shift goals:   in use, now on 3L NC.       Problem: Knowledge Deficit - Standard  Goal: Patient and family/care givers will demonstrate understanding of plan of care, disease process/condition, diagnostic tests and medications  Outcome: Progressing     Problem: Respiratory  Goal: Patient will achieve/maintain optimum respiratory ventilation and gas exchange  Outcome: Progressing         Patient is not progressing towards the following goals:

## 2022-11-05 NOTE — PROGRESS NOTES
Rec'd report from days shift RN. Assumed pt care. Assessment completed. Pt is sleeping comfortably. No outward signs of pain noted. No s/s of discomfort or distress. Q2 hour turns enforced. Family is at bedside. 1:1 sitter at bedside for safety. Bed in lowest position, bed locked, bed alarm on for safety, treaded socks in place, RN and CNA numbers provided, call light within reach.

## 2022-11-05 NOTE — ANESTHESIA PREPROCEDURE EVALUATION
Case: [id number] Date/Time: 11/05/22 1300    Procedures:       INSERTION, CATHETER      CREATION, AV FISTULA (Left)    Location: [facility] OR 10 / SURGERY [facility]    Surgeons: Ronnie Hahn M.D.          Relevant Problems   PULMONARY   (positive) COPD (chronic obstructive pulmonary disease) (HCC)      CARDIAC   (positive) Moderate aortic stenosis   (positive) Primary hypertension   (positive) Pulmonary artery hypertension (HCC)         (positive) Acute renal failure superimposed on stage 4 chronic kidney disease (HCC)   EF 65%, K- 4    Physical Exam    Airway   Mallampati: II  TM distance: >3 FB  Neck ROM: full       Cardiovascular - normal exam  Rhythm: regular  Rate: normal  (-) murmur     Dental - normal exam           Pulmonary - normal exam  Breath sounds clear to auscultation     Abdominal    Neurological - normal exam                 Anesthesia Plan    ASA 3   ASA physical status 3 criteria: COPD    Plan - general       Airway plan will be LMA          Induction: intravenous    Postoperative Plan: Postoperative administration of opioids is intended.    Pertinent diagnostic labs and testing reviewed    Informed Consent:    Anesthetic plan and risks discussed with patient.    Use of blood products discussed with: patient whom consented to blood products.

## 2022-11-05 NOTE — ANESTHESIA PROCEDURE NOTES
Airway    Date/Time: 11/5/2022 1:48 PM  Performed by: Ronnie Collier M.D.  Authorized by: Ronnie Collier M.D.     Location:  OR  Urgency:  Elective  Indications for Airway Management:  Anesthesia      Spontaneous Ventilation: absent    Sedation Level:  Deep  Preoxygenated: Yes    Final Airway Type:  Supraglottic airway  Final Supraglottic Airway:  Standard LMA    SGA Size:  4  Number of Attempts at Approach:  1

## 2022-11-05 NOTE — PROGRESS NOTES
Rec'd report from days shift RN. Assumed pt care. Assessment completed. AA&OX4. Denies pain at this time. No s/s of discomfort or distress. Pt is up self, ambulates to the bathroom and maintains steady gait. Bed in lowest position, bed locked, treaded socks in place, RN and CNA numbers provided, call light within reach.

## 2022-11-05 NOTE — CONSULTS
Vascular Surgery  New Patient Consultation    Patient:Jesi Galloway  MRN:[id number]    Date: 11/5/2022    Referring Provider: José Newberry M.d.    Consulting Physician: Ronnie Hahn MD    -------------------------------------------------------------------------------------------------    Reason for consultation:  HD access    HPI:  This is a 72 y.o. female with ESRD in need of long term dialysis access. TDC and AVF requested at this time. Patient is alert and conversant in no distress.      Past Medical History:   Diagnosis Date    COPD (chronic obstructive pulmonary disease) (HCC)     Heart murmur     Hypertension     Renal disorder        History reviewed. No pertinent surgical history.    Current Facility-Administered Medications   Medication Dose Route Frequency Provider Last Rate Last Admin    [MAR Hold] predniSONE (DELTASONE) tablet 20 mg  20 mg Oral DAILY José Newberry M.D.        [MAR Hold] heparin injection 5,000 Units  5,000 Units Subcutaneous Q8HRS Jaleel Oliveros M.D.   5,000 Units at 11/04/22 2045    [MAR Hold] ondansetron (ZOFRAN) syringe/vial injection 4 mg  4 mg Intravenous Q4HRS PRN José Newberry M.D.   4 mg at 11/05/22 1243    [MAR Hold] senna-docusate (PERICOLACE or SENOKOT S) 8.6-50 MG per tablet 2 Tablet  2 Tablet Oral BID Jaleel Oliveros M.D.   2 Tablet at 11/04/22 0609    And    [MAR Hold] polyethylene glycol/lytes (MIRALAX) PACKET 1 Packet  1 Packet Oral QDAY PRN Jaleel Oliveros M.D.        And    [MAR Hold] magnesium hydroxide (MILK OF MAGNESIA) suspension 30 mL  30 mL Oral QDAY PRN Jaleel Oliveros M.D.        And    [MAR Hold] bisacodyl (DULCOLAX) suppository 10 mg  10 mg Rectal QDAY PRN Jaleel Oliveros M.D.        [MAR Hold] Pharmacy Consult Request - to monitor for nephrotoxic agents  1 Each Other PHARMACY TO DOSE Jaleel Oliveros M.D.        [MAR Hold] Respiratory Therapy Consult   Nebulization Continuous RT Jaleel Oliveros M.D.        [MAR Hold]  ipratropium-albuterol (DUONEB) nebulizer solution  3 mL Nebulization Q4H PRN (RT) Jaleel Oliveros M.D.        [MAR Hold] acetaminophen (Tylenol) tablet 650 mg  650 mg Oral Q6HRS PRN Jaleel Oliveros M.D.        [MAR Hold] labetalol (NORMODYNE/TRANDATE) injection 10 mg  10 mg Intravenous Q4HRS PRN Jaleel Oliveros M.D.        [MAR Hold] ezetimibe (ZETIA) tablet 10 mg  10 mg Oral DAILY Jaleel Oliveros M.D.   10 mg at 22 0608    [MAR Hold] carvedilol (COREG) tablet 25 mg  25 mg Oral BID WITH MEALS Jaleel Oliveros M.D.   25 mg at 22 0830    [MAR Hold] amLODIPine (NORVASC) tablet 10 mg  10 mg Oral Q DAY Jaleel Oliveros M.D.   10 mg at 22 0608    [MAR Hold] PARoxetine (PAXIL) tablet 10 mg  10 mg Oral DAILY Jaleel Oliveros M.D.   10 mg at 22 0608    [MAR Hold] hydrALAZINE (APRESOLINE) tablet 100 mg  100 mg Oral Q8HRS Jaleel Oliveros M.D.   100 mg at 22 0608    [MAR Hold] traZODone (DESYREL) tablet 50 mg  50 mg Oral QHS PRN Jaleel Oliveros M.D.   50 mg at 229    [MAR Hold] calcium acetate (PHOS-LO) 667 MG tablet 667 mg  667 mg Oral TID AC Jaleel Oliveros M.D.   667 mg at 22 1811    [MAR Hold] insulin regular (HumuLIN R,NovoLIN R) injection  3-14 Units Subcutaneous 4X/DAY ACHS Jaleel Oliveros M.D.        And    [MAR Hold] dextrose 10 % BOLUS 25 g  25 g Intravenous Q15 MIN PRN Jaleel Oliveros M.D.           Social History     Socioeconomic History    Marital status:      Spouse name: Not on file    Number of children: Not on file    Years of education: Not on file    Highest education level: Not on file   Occupational History    Not on file   Tobacco Use    Smoking status: Former     Types: Cigarettes     Quit date: 10/1/2014     Years since quittin.1    Smokeless tobacco: Never   Substance and Sexual Activity    Alcohol use: Not on file    Drug use: Not on file    Sexual activity: Not on file   Other Topics Concern    Not on file   Social History  "Narrative    Not on file     Social Determinants of Health     Financial Resource Strain: Not on file   Food Insecurity: Not on file   Transportation Needs: Not on file   Physical Activity: Not on file   Stress: Not on file   Social Connections: Not on file   Intimate Partner Violence: Not on file   Housing Stability: Not on file       History reviewed. No pertinent family history.    Allergies:  Codeine and Hydrocodone    Review of Systems:    Constitutional: Negative for fever, chills, weight loss,   HENT:   Negative for hearing loss or tinnitus    Eyes:    Negative for blurred vision, double vision, or loss of vision  Respiratory:  Negative for cough, hemoptysis, or wheezing    Cardiac:  Negative for chest pain or palpitations or orthopnea  Vascular:  Negative for claudication or rest pain   Gastrointestinal: Negative for nausea, vomiting, or abdominal pain     Negative for hematochezia or melena   Genitourinary: Negative for dysuria, frequency, or hematuria   Musculoskeletal: Negative for myalgias, back pain, or joint pain  Skin:   Negative for itching or rash  Neurological:  Negative for dizziness, headaches, or tremors     Negative for speech disturbance     Negative for extremity weakness or paresthesias  Endo/Heme:  Negative for easy bruising or bleeding  Psychiatric:  Negative for depression, suicidal ideas, or hallucinations    Physical Exam:  BP (!) 162/71   Pulse 72   Temp 36.1 °C (96.9 °F) (Temporal)   Resp 16   Ht 1.575 m (5' 2\")   Wt 51.5 kg (113 lb 8.6 oz)   SpO2 94%     Constitutional: Alert, oriented, no acute distress  HEENT:  Normocephalic and atraumatic, EOMI  Neck:   Supple, no JVD,   Cardiovascular: Regular rate and rhythm,   Pulmonary:  Good air entry bilaterally,    Abdominal:  Soft, non-tender, non-distended         Musculoskeletal: No tenderness, no deformity  Neurological:  CN II-XII grossly intact, no focal deficits  Skin:   Skin is warm and dry. No rash noted.  Vascular " exam:    RUE: warm, well perfused, no edema  LUE: warm, well perfused, no edema  RLE: warm, well perfused, no edema  LLE: warm, well perfused, no edema      Labs:  Recent Labs     11/03/22 2007 11/04/22  0249 11/05/22  0104   WBC 13.6* 9.6 8.3   RBC 3.79* 3.69* 3.59*   HEMOGLOBIN 11.6* 10.8* 10.5*   HEMATOCRIT 35.5* 34.2* 33.4*   MCV 93.7 92.7 93.0   MCH 30.6 29.3 29.2   MCHC 32.7* 31.6* 31.4*   RDW 43.0 42.3 42.0   PLATELETCT 349 301 262   MPV 9.6 9.6 10.1     Recent Labs     11/03/22 2007 11/05/22  0104 11/05/22  1109   SODIUM 138  139 137  --    POTASSIUM 4.0  4.1 4.0  --    CHLORIDE 97  98 99  --    CO2 21  21 21  --    GLUCOSE 180*  183* 106*  --    BUN 94*  94* 99*  --    CREATININE 5.82*  5.74* 5.71*  --    CALCIUM 8.5  8.4* 8.1* 8.4*         Recent Labs     11/03/22 2007   ASTSGOT 13   ALTSGPT 13   TBILIRUBIN 0.2   ALKPHOSPHAT 71   GLOBULIN 2.8         Radiology:  Bedside ultrasound shows the left upper arm cephalic vein is adequate for AVF creation      Assessment/Plan:   -  ESRD    OR today for TDC and AVF    I explained the details of the operation, alternatives, and potential risks, including but not limited to bleeding, infection, and risks of anesthesia.  All questions were answered. Patient understands and agrees to proceed.      Ronnie Hahn MD  [facility]   Voalte preferred or call my office [phone]  __________________________________________________________________  Patient:Jesi Galloway   MRN:[id number]   [facility]:[id number]

## 2022-11-05 NOTE — PROGRESS NOTES
Assumed care of patient at 0700 from Roxanna HENRY. Patient is AO4, states pain level of 0/10. Bed is locked in lowest position, upper bed rails are up, call light and personal belongings are within reach. Bed alarm is not appropriate at this time and nonskid socks are being worn. Hourly rounding is in place.

## 2022-11-05 NOTE — PROGRESS NOTES
Hospital Medicine Daily Progress Note    Date of Service  11/5/2022    Chief Complaint  Jesi Galloway is a 72 y.o. female admitted 11/3/2022 with worsening kidney function    Hospital Course  No notes on file    Interval Problem Update  Mild shortness of breath    As per Dr. Lugo of renal-patient will require dialysis    Patient denies any pain    Creatinine is elevated 5.82    Patient states that her normal creatinine is around 3    Dr.'s lugo of nephrology is aware and will be consulting    I have discussed this patient's plan of care and discharge plan at IDT rounds today with Case Management, Nursing, Nursing leadership, and other members of the IDT team.    Consultants/Specialty  nephrology    Code Status  Full Code    Disposition  Patient is not medically cleared for discharge.   Anticipate discharge to to home with close outpatient follow-up.  I have placed the appropriate orders for post-discharge needs.    Review of Systems  Review of Systems   Constitutional:  Negative for chills and fever.   HENT:  Negative for ear pain, hearing loss and tinnitus.    Eyes:  Negative for blurred vision, double vision and photophobia.   Respiratory:  Positive for shortness of breath. Negative for cough, hemoptysis and sputum production.    Cardiovascular:  Negative for chest pain, palpitations, orthopnea and claudication.   Gastrointestinal:  Negative for abdominal pain, heartburn, nausea and vomiting.   Genitourinary:  Negative for dysuria, frequency and urgency.   Musculoskeletal:  Negative for back pain and myalgias.   Neurological:  Negative for dizziness, tingling, tremors, sensory change and headaches.   Psychiatric/Behavioral:  Negative for depression, substance abuse and suicidal ideas.    All other systems reviewed and are negative.     Physical Exam  Temp:  [36.4 °C (97.6 °F)-37 °C (98.6 °F)] 36.9 °C (98.5 °F)  Pulse:  [68-74] 74  Resp:  [17-20] 17  BP: (128-160)/(52-61) 160/61  SpO2:  [93 %-95 %] 94  %    Physical Exam  Constitutional:       General: She is not in acute distress.     Appearance: She is not ill-appearing, toxic-appearing or diaphoretic.   HENT:      Head: Normocephalic.      Mouth/Throat:      Mouth: Mucous membranes are moist.   Eyes:      General: No scleral icterus.        Left eye: No discharge.      Extraocular Movements: Extraocular movements intact.      Pupils: Pupils are equal, round, and reactive to light.   Cardiovascular:      Rate and Rhythm: Normal rate.      Heart sounds: No murmur heard.    No gallop.   Pulmonary:      Effort: Pulmonary effort is normal. No respiratory distress.      Breath sounds: No wheezing or rales.      Comments: Decreased breath sounds bilaterally  Abdominal:      General: There is no distension.      Palpations: There is no mass.      Tenderness: There is no abdominal tenderness. There is no guarding or rebound.      Hernia: No hernia is present.   Musculoskeletal:         General: No swelling or deformity. Normal range of motion.      Cervical back: Normal range of motion. No rigidity.   Skin:     Capillary Refill: Capillary refill takes 2 to 3 seconds.      Coloration: Skin is not jaundiced.      Findings: No bruising or lesion.   Neurological:      General: No focal deficit present.      Cranial Nerves: No cranial nerve deficit.      Motor: No weakness.      Gait: Gait normal.   Psychiatric:         Mood and Affect: Mood normal.       Fluids    Intake/Output Summary (Last 24 hours) at 11/5/2022 1145  Last data filed at 11/5/2022 1027  Gross per 24 hour   Intake 340 ml   Output 1800 ml   Net -1460 ml         Laboratory  Recent Labs     11/03/22 2007 11/04/22  0249 11/05/22  0104   WBC 13.6* 9.6 8.3   RBC 3.79* 3.69* 3.59*   HEMOGLOBIN 11.6* 10.8* 10.5*   HEMATOCRIT 35.5* 34.2* 33.4*   MCV 93.7 92.7 93.0   MCH 30.6 29.3 29.2   MCHC 32.7* 31.6* 31.4*   RDW 43.0 42.3 42.0   PLATELETCT 349 301 262   MPV 9.6 9.6 10.1       Recent Labs     11/03/22 2007  11/05/22  0104   SODIUM 138  139 137   POTASSIUM 4.0  4.1 4.0   CHLORIDE 97  98 99   CO2 21  21 21   GLUCOSE 180*  183* 106*   BUN 94*  94* 99*   CREATININE 5.82*  5.74* 5.71*   CALCIUM 8.5  8.4* 8.1*                     Imaging  DX-CHEST-LIMITED (1 VIEW)   Final Result      No acute cardiopulmonary disease evident.             Assessment/Plan  * Acute renal failure superimposed on stage 4 chronic kidney disease (HCC)- (present on admission)  Assessment & Plan  Unclear etiology, no recent renal toxic medications, no contrast, no episodes of hypotension.  Received fluid bolus while out OSH with worsening renal function followed by 3 days of daily IV lasix and albumin again with worsening renal function.    Renal US normal from OSH    Renal dose meds and avoid nephrotoxins  Monitor I&O's  Follow renal function    Nephrology MD on the case-plan for dialysis     on PhosLo    Pulmonary artery hypertension (HCC)  Assessment & Plan  Estimated PA pressure 60 mmHg on recent echo, EF 65% grade 2 diastolic dysfunction moderate aortic stenosis moderate aortic regurg.  On room air, euvolemic.  Watch volume and respiratory status closely.  Continuous pulse ox    Diabetes mellitus (HCC)- (present on admission)  Assessment & Plan  Insulin sliding scale, diabetic diet, POC glucose checks    Anemia- (present on admission)  Assessment & Plan  No reports of hemorrhage, suspect secondary to chronic renal disease.  Continue to monitor and transfuse for hemoglobin less than 7.    Leukocytosis- (present on admission)  Assessment & Plan  I suspect secondary to steroid administration for COPD.          Primary hypertension- (present on admission)  Assessment & Plan  Presented to OSH with systolic blood pressure in the 200s, on Coreg and amlodipine at home, hydralazine has been added at OSH due to persistent hypertension.  Continue amlodipine, hydralazine, Coreg.  IV antihypertensives ordered with parameters.    Moderate aortic  stenosis- (present on admission)  Assessment & Plan  Noted on TTE from OSH.  She is euvolemic, watch respiratory status and volume status closely.    Chronic heart failure with preserved ejection fraction (HCC)- (present on admission)  Assessment & Plan  Newly diagnosed HFpEF, TTE at OSH LVEF 65% PA pressure 60 mmHg grade 2 diastolic dysfunction moderate aortic stenosis moderate aortic regurg.      I will defer diuresis to renal MD    Plan for dialysis to initiate which will help with fluid management    Continue Coreg, statin  BNP on presentation at OSH 5700, troponin T 16    COPD (chronic obstructive pulmonary disease) (HCC)- (present on admission)  Assessment & Plan  Presented to OSH with acute exacerbation   No wheeze, on room air, will not continue steroids or antibiotics at this time.  DuoNebs as needed, continuous pulse ox, RT consult    11/5/2022 patient did have decreased sounds bilaterally and complaining of some mild dyspnea-patient started on low-dose prednisone 20 mg p.o. daily for next 4 days       VTE prophylaxis: SCDs/TEDs    I have performed a physical exam and reviewed and updated ROS and Plan today (11/5/2022). In review of yesterday's note (11/4/2022), there are no changes except as documented above.

## 2022-11-06 PROBLEM — J44.1 CHRONIC OBSTRUCTIVE PULMONARY DISEASE WITH ACUTE EXACERBATION (HCC): Status: ACTIVE | Noted: 2022-11-04

## 2022-11-06 PROBLEM — R11.0 NAUSEA: Status: ACTIVE | Noted: 2022-11-06

## 2022-11-06 LAB
ANION GAP SERPL CALC-SCNC: 18 MMOL/L (ref 7–16)
BUN SERPL-MCNC: 70 MG/DL (ref 8–22)
CALCIUM SERPL-MCNC: 8.5 MG/DL (ref 8.5–10.5)
CHLORIDE SERPL-SCNC: 96 MMOL/L (ref 96–112)
CO2 SERPL-SCNC: 18 MMOL/L (ref 20–33)
CREAT SERPL-MCNC: 3.76 MG/DL (ref 0.5–1.4)
ERYTHROCYTE [DISTWIDTH] IN BLOOD BY AUTOMATED COUNT: 41.2 FL (ref 35.9–50)
GFR SERPLBLD CREATININE-BSD FMLA CKD-EPI: 12 ML/MIN/1.73 M 2
GLUCOSE BLD STRIP.AUTO-MCNC: 118 MG/DL (ref 65–99)
GLUCOSE BLD STRIP.AUTO-MCNC: 130 MG/DL (ref 65–99)
GLUCOSE BLD STRIP.AUTO-MCNC: 136 MG/DL (ref 65–99)
GLUCOSE BLD STRIP.AUTO-MCNC: 151 MG/DL (ref 65–99)
GLUCOSE SERPL-MCNC: 124 MG/DL (ref 65–99)
HCT VFR BLD AUTO: 33 % (ref 37–47)
HGB BLD-MCNC: 10.7 G/DL (ref 12–16)
MCH RBC QN AUTO: 29.9 PG (ref 27–33)
MCHC RBC AUTO-ENTMCNC: 32.4 G/DL (ref 33.6–35)
MCV RBC AUTO: 92.2 FL (ref 81.4–97.8)
PLATELET # BLD AUTO: 220 K/UL (ref 164–446)
PMV BLD AUTO: 10.1 FL (ref 9–12.9)
POTASSIUM SERPL-SCNC: 4.9 MMOL/L (ref 3.6–5.5)
RBC # BLD AUTO: 3.58 M/UL (ref 4.2–5.4)
SODIUM SERPL-SCNC: 132 MMOL/L (ref 135–145)
WBC # BLD AUTO: 11.7 K/UL (ref 4.8–10.8)

## 2022-11-06 PROCEDURE — 36415 COLL VENOUS BLD VENIPUNCTURE: CPT

## 2022-11-06 PROCEDURE — 80048 BASIC METABOLIC PNL TOTAL CA: CPT

## 2022-11-06 PROCEDURE — 770006 HCHG ROOM/CARE - MED/SURG/GYN SEMI*

## 2022-11-06 PROCEDURE — A9270 NON-COVERED ITEM OR SERVICE: HCPCS

## 2022-11-06 PROCEDURE — 700102 HCHG RX REV CODE 250 W/ 637 OVERRIDE(OP)

## 2022-11-06 PROCEDURE — 700102 HCHG RX REV CODE 250 W/ 637 OVERRIDE(OP): Performed by: STUDENT IN AN ORGANIZED HEALTH CARE EDUCATION/TRAINING PROGRAM

## 2022-11-06 PROCEDURE — A9270 NON-COVERED ITEM OR SERVICE: HCPCS | Performed by: STUDENT IN AN ORGANIZED HEALTH CARE EDUCATION/TRAINING PROGRAM

## 2022-11-06 PROCEDURE — 700102 HCHG RX REV CODE 250 W/ 637 OVERRIDE(OP): Performed by: HOSPITALIST

## 2022-11-06 PROCEDURE — 700111 HCHG RX REV CODE 636 W/ 250 OVERRIDE (IP): Performed by: STUDENT IN AN ORGANIZED HEALTH CARE EDUCATION/TRAINING PROGRAM

## 2022-11-06 PROCEDURE — 700111 HCHG RX REV CODE 636 W/ 250 OVERRIDE (IP)

## 2022-11-06 PROCEDURE — 90935 HEMODIALYSIS ONE EVALUATION: CPT

## 2022-11-06 PROCEDURE — 82962 GLUCOSE BLOOD TEST: CPT | Mod: 91

## 2022-11-06 PROCEDURE — 85027 COMPLETE CBC AUTOMATED: CPT

## 2022-11-06 PROCEDURE — 700111 HCHG RX REV CODE 636 W/ 250 OVERRIDE (IP): Performed by: HOSPITALIST

## 2022-11-06 PROCEDURE — 99233 SBSQ HOSP IP/OBS HIGH 50: CPT | Performed by: HOSPITALIST

## 2022-11-06 PROCEDURE — A9270 NON-COVERED ITEM OR SERVICE: HCPCS | Performed by: HOSPITALIST

## 2022-11-06 RX ORDER — OMEPRAZOLE 20 MG/1
20 CAPSULE, DELAYED RELEASE ORAL 2 TIMES DAILY
Status: DISCONTINUED | OUTPATIENT
Start: 2022-11-06 | End: 2022-11-09 | Stop reason: HOSPADM

## 2022-11-06 RX ORDER — HEPARIN SODIUM 1000 [USP'U]/ML
INJECTION, SOLUTION INTRAVENOUS; SUBCUTANEOUS
Status: COMPLETED
Start: 2022-11-06 | End: 2022-11-06

## 2022-11-06 RX ORDER — DIAZEPAM 5 MG/1
2.5 TABLET ORAL ONCE
Status: COMPLETED | OUTPATIENT
Start: 2022-11-06 | End: 2022-11-06

## 2022-11-06 RX ORDER — METOCLOPRAMIDE HYDROCHLORIDE 5 MG/ML
5 INJECTION INTRAMUSCULAR; INTRAVENOUS EVERY 6 HOURS
Status: DISCONTINUED | OUTPATIENT
Start: 2022-11-06 | End: 2022-11-07

## 2022-11-06 RX ORDER — PROMETHAZINE HYDROCHLORIDE 25 MG/1
25 TABLET ORAL ONCE
Status: COMPLETED | OUTPATIENT
Start: 2022-11-06 | End: 2022-11-06

## 2022-11-06 RX ADMIN — HEPARIN SODIUM 3300 UNITS: 1000 INJECTION, SOLUTION INTRAVENOUS; SUBCUTANEOUS at 16:25

## 2022-11-06 RX ADMIN — ONDANSETRON 4 MG: 2 INJECTION INTRAMUSCULAR; INTRAVENOUS at 12:34

## 2022-11-06 RX ADMIN — AMLODIPINE BESYLATE 10 MG: 10 TABLET ORAL at 04:20

## 2022-11-06 RX ADMIN — PREDNISONE 20 MG: 20 TABLET ORAL at 04:20

## 2022-11-06 RX ADMIN — HEPARIN SODIUM 5000 UNITS: 5000 INJECTION, SOLUTION INTRAVENOUS; SUBCUTANEOUS at 21:16

## 2022-11-06 RX ADMIN — PROMETHAZINE HYDROCHLORIDE 25 MG: 25 TABLET ORAL at 07:25

## 2022-11-06 RX ADMIN — METOCLOPRAMIDE 5 MG: 5 INJECTION, SOLUTION INTRAMUSCULAR; INTRAVENOUS at 12:34

## 2022-11-06 RX ADMIN — METOCLOPRAMIDE 5 MG: 5 INJECTION, SOLUTION INTRAMUSCULAR; INTRAVENOUS at 17:37

## 2022-11-06 RX ADMIN — SENNOSIDES AND DOCUSATE SODIUM 2 TABLET: 50; 8.6 TABLET ORAL at 17:37

## 2022-11-06 RX ADMIN — CARVEDILOL 25 MG: 25 TABLET, FILM COATED ORAL at 07:25

## 2022-11-06 RX ADMIN — OMEPRAZOLE 20 MG: 20 CAPSULE, DELAYED RELEASE ORAL at 18:00

## 2022-11-06 RX ADMIN — HYDRALAZINE HYDROCHLORIDE 100 MG: 50 TABLET, FILM COATED ORAL at 04:19

## 2022-11-06 RX ADMIN — INSULIN HUMAN 3 UNITS: 100 INJECTION, SOLUTION PARENTERAL at 21:17

## 2022-11-06 RX ADMIN — PAROXETINE HYDROCHLORIDE 10 MG: 10 TABLET, FILM COATED ORAL at 04:20

## 2022-11-06 RX ADMIN — HYDRALAZINE HYDROCHLORIDE 100 MG: 50 TABLET, FILM COATED ORAL at 21:19

## 2022-11-06 RX ADMIN — Medication 667 MG: at 17:36

## 2022-11-06 RX ADMIN — DIAZEPAM 2.5 MG: 5 TABLET ORAL at 19:15

## 2022-11-06 RX ADMIN — ONDANSETRON 4 MG: 2 INJECTION INTRAMUSCULAR; INTRAVENOUS at 17:37

## 2022-11-06 RX ADMIN — EZETIMIBE 10 MG: 10 TABLET ORAL at 04:28

## 2022-11-06 RX ADMIN — Medication 667 MG: at 07:25

## 2022-11-06 RX ADMIN — OMEPRAZOLE 20 MG: 20 CAPSULE, DELAYED RELEASE ORAL at 08:05

## 2022-11-06 RX ADMIN — CARVEDILOL 25 MG: 25 TABLET, FILM COATED ORAL at 17:37

## 2022-11-06 RX ADMIN — METOCLOPRAMIDE 5 MG: 5 INJECTION, SOLUTION INTRAMUSCULAR; INTRAVENOUS at 08:05

## 2022-11-06 RX ADMIN — HEPARIN SODIUM 5000 UNITS: 5000 INJECTION, SOLUTION INTRAVENOUS; SUBCUTANEOUS at 04:19

## 2022-11-06 RX ADMIN — ONDANSETRON 4 MG: 2 INJECTION INTRAMUSCULAR; INTRAVENOUS at 04:23

## 2022-11-06 ASSESSMENT — ENCOUNTER SYMPTOMS
TREMORS: 0
MYALGIAS: 0
FEVER: 0
HEMOPTYSIS: 0
NAUSEA: 0
DEPRESSION: 0
TINGLING: 0
ABDOMINAL PAIN: 0
BLURRED VISION: 0
COUGH: 0
SPUTUM PRODUCTION: 0
PHOTOPHOBIA: 0
BACK PAIN: 0
ORTHOPNEA: 0
HEARTBURN: 0
CLAUDICATION: 0
SHORTNESS OF BREATH: 1
VOMITING: 0
DIZZINESS: 0
CHILLS: 0
DOUBLE VISION: 0
PALPITATIONS: 0
SENSORY CHANGE: 0
HEADACHES: 0

## 2022-11-06 ASSESSMENT — LIFESTYLE VARIABLES: SUBSTANCE_ABUSE: 0

## 2022-11-06 ASSESSMENT — PAIN DESCRIPTION - PAIN TYPE
TYPE: ACUTE PAIN

## 2022-11-06 NOTE — CARE PLAN
The patient is Stable - Low risk of patient condition declining or worsening    Shift Goals  Clinical Goals: dialysis  Patient Goals: rest and comfort      Problem: Physical Regulation:  Goal: Diagnostic test results will improve  Outcome: Progressing     Problem: Knowledge Deficit:  Goal: Patient's knowledge of the prescribed therapeutic regimen will improve  Outcome: Progressing     Problem: Respiratory  Goal: Patient will achieve/maintain optimum respiratory ventilation and gas exchange  Outcome: Progressing     Problem: Fall Risk  Goal: Patient will remain free from falls  Outcome: Progressing     Problem: Physical Regulation:  Goal: Diagnostic test results will improve  Outcome: Progressing     Problem: Knowledge Deficit:  Goal: Patient's knowledge of the prescribed therapeutic regimen will improve  Outcome: Progressing     Problem: Respiratory  Goal: Patient will achieve/maintain optimum respiratory ventilation and gas exchange  Outcome: Progressing     Problem: Fall Risk  Goal: Patient will remain free from falls  Outcome: Progressing     Progress made toward(s) clinical / shift goals:  Pt extremely fatigued. Dialysis completed today.

## 2022-11-06 NOTE — PROGRESS NOTES
[facility] -  PROGRESS NOTE               Author: JACQUELINE Love Date & Time: 11/6/2022  11:47 AM     HPI:  Ms. Galloway is a 73 yo F who presented to OSH on 10/30/2022 with chief complaint of cough and shortness of breath. Cough and shortness of breath been ongoing since starting nifedipine 3 weeks prior, medication was stopped however symptoms persisted. She had a productive cough no fevers wheezing orthopnea. Denies chest pain fever sick contacts urinary symptoms vomiting diarrhea abdominal pain. She is a former smoker with COPD not on on home oxygen therapy. She has also has h/o hypertension, CKD stage IV, and diabetes mellitus.  When she initially presented her Serum creatinine was 3.83. On hospital day 2 creatinine worsened to 4.7, noted to have good urine output.  She was given 1 L bolus repeat creatinine and labs showed worsening creatinine up to 4.9.  She was given Lasix 40 mg IV daily with albumin 25 g IV (ultimately for 3 days with progression of renal failure).  On hospital day 3 creatinine elevated to 5, hospital day 4 creatinine 5.3 serum bicarb 18 urine output 725 cc/in prior 24 hours. She was transferred to [facility] 11/3/2022 from [facility] for progressive renal failure. Nephrology was asked to consult. She does have some Nausea but no vomiting. Her baseline scr is ~ 3-3.5.     Echocardiogram obtained  from OSH showed EF 65% PA pressure 60 mmHg, grade 2 diastolic dysfunction, moderate aortic stenosis, moderate aortic regurgitation.  She has been receiving methylprednisolone 40 mg every 8 hours.  Renal ultrasound from OSH shows no abnormalities.     DAILY NEPHROLOGY SUMMARY:  11/05 - Scr unchanged from yesterday. Non-oliguric. Feels worse than yesterday.  11/06 - Pt sitting up in bed, family at bedside, feels general malaise and nausea after first HD yesterday with 1L net UF, has RIJ TDC and new LUE AVF, labs reviewed, BP elevated, resp status stable on 2L NC  "O2, 1.2L UOP recorded    REVIEW OF SYSTEMS:    10 point ROS reviewed and is as per HPI/daily summary or otherwise negative    PMH/PSH/SH/FH:   Reviewed and unchanged since admission note    CURRENT MEDICATIONS:   Reviewed from admission to present day    VS:  BP (!) 166/55 Comment: rn notified  Pulse 82   Temp 36.8 °C (98.2 °F) (Temporal)   Resp 15   Ht 1.575 m (5' 2\")   Wt 51.5 kg (113 lb 8.6 oz)   SpO2 93%   BMI 20.77 kg/m²     Physical Exam  Constitutional:       Appearance: Normal appearance.   HENT:      Head: Normocephalic and atraumatic.      Nose: Nose normal.      Mouth/Throat:      Mouth: Mucous membranes are moist.      Pharynx: Oropharynx is clear.   Eyes:      Extraocular Movements: Extraocular movements intact.      Conjunctiva/sclera: Conjunctivae normal.      Pupils: Pupils are equal, round, and reactive to light.   Cardiovascular:      Rate and Rhythm: Normal rate and regular rhythm.      Pulses: Normal pulses.      Heart sounds: Normal heart sounds.      Comments: Immature LUE AVF  RIJ TDC CDI  Pulmonary:      Effort: Pulmonary effort is normal.      Breath sounds: Normal breath sounds.   Abdominal:      General: Abdomen is flat. Bowel sounds are normal.      Palpations: Abdomen is soft.   Musculoskeletal:         General: Normal range of motion.      Cervical back: Normal range of motion.   Skin:     General: Skin is warm.      Capillary Refill: Capillary refill takes less than 2 seconds.   Neurological:      General: No focal deficit present.      Mental Status: She is alert and oriented to person, place, and time. Mental status is at baseline.   Psychiatric:         Mood and Affect: Mood normal.         Behavior: Behavior normal.         Thought Content: Thought content normal.         Judgment: Judgment normal.       Fluids:  In: 750 [I.V.:250; Dialysis:500]  Out: 2700     LABS:  Recent Labs     11/03/22  2007 11/05/22  0104 11/05/22  1109 11/06/22  1017   SODIUM 138  139 137  --  132* "   POTASSIUM 4.0  4.1 4.0  --  4.9   CHLORIDE 97  98 99  --  96   CO2 21  21 21  --  18*   GLUCOSE 180*  183* 106*  --  124*   BUN 94*  94* 99*  --  70*   CREATININE 5.82*  5.74* 5.71*  --  3.76*   CALCIUM 8.5  8.4* 8.1* 8.4* 8.5         IMAGING:   All imaging reviewed from admission to present day    IMPRESSION:  # Likely progressive CKD -  now ESRD. + Uremic  - KRIS TDC and KERI AVF completed 11/5 with Dr. Hahn  - HD initiated 11/5/22  # CKD stage IV  # DM  # Anemia in CKD, at target  - %sat 90, ferritin 401  # HTN, labile control   # Moderate Aortic stenosis  # h/o CHF with preserved EF  # COPD  # Pulm HTN  # CKD-MBD  - On calcium acetate WM  - iPTH 183  - Phos 7.9      PLAN:  - iHD # 2 today (SUN) and # 3 tomorrow (MON) followed by qMWF schedule  - UF as tolerated   - Dose all meds per ESRD/HD  - Hold diuretics for now  - Ordered Quant Gold and Hepatitis  - Continue phos binder, trend phos  - Will need OP HD chair secured prior to DC  - Strict I/Os  - Renal diet  - Avoid nephrotoxins/NSAIDs  - Transfuse PRN Hgb <7   - Goal BP <140/90  - Labs in AM (MON)    Thank you,

## 2022-11-06 NOTE — PROGRESS NOTES
VASCULAR SURGERY SERVICE                        Progress Note  _________________________________    POD 1 TDC and left radiocephalic AVF creation  TDC has mild hematoma around it but no active bleeding, and it worked for dialysis yesterday  Left wrist AVF is patent with brisk thrill and the incision is healing well with no hematoma    No further vascular imaging or intervention needed at this time.    Patient can follow-up with vascular service as needed if concern arise.    Ronnie Hahn MD  [facility]   Voalte preferred or call my office [phone]  __________________________________________________  Patient:Jesi Galloway   MRN:[id number]

## 2022-11-06 NOTE — PROGRESS NOTES
Report given to Pre-OP and all questions answered, Patient to go for hemdodialysis catheter. Family aware of procedure.

## 2022-11-06 NOTE — PROGRESS NOTES
[facility] Progress Note    Hemodialysis treatment #2 ordered today per Dr. Joseph x 3 hours.   Treatment initiated at 1324.     Patient with c/o nausea during treatment. BP elevated throughout.   See electronic flow sheet for details.     Net UF 1,000 mL.     Post tx, CVC flushed with saline then locked with heparin 1000 units/mL per designated amount in each wing then clamped and capped. Aspirate heparin prior to next CVC use.    Report given to Primary RN.

## 2022-11-06 NOTE — PROGRESS NOTES
Hospital Medicine Daily Progress Note    Date of Service  11/6/2022    Chief Complaint  Jesi Galloway is a 72 y.o. female admitted 11/3/2022 with worsening kidney function    Hospital Course  No notes on file    Interval Problem Update  Shortness of breath a little bit worse today    Patient has dialysis catheter    Patient had dialysis yesterday    Since her first dialysis session patient says dealing with nausea    Patient was given Zofran and Phenergan for nausea with minimal improvement in her complaints    Patient denies any pain    Creatinine is elevated 5.71    Patient states that her normal creatinine is around 3      I have discussed this patient's plan of care and discharge plan at IDT rounds today with Case Management, Nursing, Nursing leadership, and other members of the IDT team.    Consultants/Specialty  nephrology    Code Status  Full Code    Disposition  Patient is not medically cleared for discharge.   Anticipate discharge to to home with close outpatient follow-up.  I have placed the appropriate orders for post-discharge needs.    Review of Systems  Review of Systems   Constitutional:  Negative for chills and fever.   HENT:  Negative for ear pain, hearing loss and tinnitus.    Eyes:  Negative for blurred vision, double vision and photophobia.   Respiratory:  Positive for shortness of breath. Negative for cough, hemoptysis and sputum production.    Cardiovascular:  Negative for chest pain, palpitations, orthopnea and claudication.   Gastrointestinal:  Negative for abdominal pain, heartburn, nausea and vomiting.   Genitourinary:  Negative for dysuria, frequency and urgency.   Musculoskeletal:  Negative for back pain and myalgias.   Neurological:  Negative for dizziness, tingling, tremors, sensory change and headaches.   Psychiatric/Behavioral:  Negative for depression, substance abuse and suicidal ideas.    All other systems reviewed and are negative.     Physical Exam  Temp:  [35.9 °C  (96.7 °F)-36.8 °C (98.2 °F)] 36.8 °C (98.2 °F)  Pulse:  [57-82] 82  Resp:  [12-20] 15  BP: (114-166)/(55-71) 166/55  SpO2:  [91 %-99 %] 93 %    Physical Exam  Constitutional:       General: She is not in acute distress.     Appearance: She is not ill-appearing, toxic-appearing or diaphoretic.   HENT:      Head: Normocephalic.      Mouth/Throat:      Mouth: Mucous membranes are moist.   Eyes:      General: No scleral icterus.        Left eye: No discharge.      Extraocular Movements: Extraocular movements intact.      Pupils: Pupils are equal, round, and reactive to light.   Cardiovascular:      Rate and Rhythm: Normal rate.      Heart sounds: No murmur heard.    No gallop.   Pulmonary:      Effort: Respiratory distress present.      Breath sounds: Rhonchi present. No wheezing or rales.      Comments: Decreased breath sounds bilaterally  Abdominal:      General: There is no distension.      Palpations: There is no mass.      Tenderness: There is no abdominal tenderness. There is no guarding or rebound.      Hernia: No hernia is present.   Musculoskeletal:         General: No swelling or deformity. Normal range of motion.      Cervical back: Normal range of motion. No rigidity.   Skin:     Capillary Refill: Capillary refill takes 2 to 3 seconds.      Coloration: Skin is not jaundiced.      Findings: No bruising or lesion.   Neurological:      General: No focal deficit present.      Cranial Nerves: No cranial nerve deficit.      Motor: No weakness.      Gait: Gait normal.   Psychiatric:         Mood and Affect: Mood normal.       Fluids    Intake/Output Summary (Last 24 hours) at 11/6/2022 1004  Last data filed at 11/6/2022 0401  Gross per 24 hour   Intake 750 ml   Output 2700 ml   Net -1950 ml         Laboratory  Recent Labs     11/03/22 2007 11/04/22  0249 11/05/22  0104   WBC 13.6* 9.6 8.3   RBC 3.79* 3.69* 3.59*   HEMOGLOBIN 11.6* 10.8* 10.5*   HEMATOCRIT 35.5* 34.2* 33.4*   MCV 93.7 92.7 93.0   MCH 30.6 29.3 29.2    MCHC 32.7* 31.6* 31.4*   RDW 43.0 42.3 42.0   PLATELETCT 349 301 262   MPV 9.6 9.6 10.1       Recent Labs     11/03/22 2007 11/05/22  0104 11/05/22  1109   SODIUM 138  139 137  --    POTASSIUM 4.0  4.1 4.0  --    CHLORIDE 97  98 99  --    CO2 21  21 21  --    GLUCOSE 180*  183* 106*  --    BUN 94*  94* 99*  --    CREATININE 5.82*  5.74* 5.71*  --    CALCIUM 8.5  8.4* 8.1* 8.4*                     Imaging  DX-PORTABLE FLUOROSCOPY < 1 HOUR   Final Result      Intraoperative fluoroscopy spot images as described above.      DX-CHEST-LIMITED (1 VIEW)   Final Result      No acute cardiopulmonary disease evident.             Assessment/Plan  * Acute renal failure superimposed on stage 4 chronic kidney disease (HCC)- (present on admission)  Assessment & Plan  Unclear etiology, no recent renal toxic medications, no contrast, no episodes of hypotension.  Received fluid bolus while out OSH with worsening renal function followed by 3 days of daily IV lasix and albumin again with worsening renal function.    Renal US normal from OSH    Renal dose meds and avoid nephrotoxins  Monitor I&O's  Follow renal function    Nephrology MD on the case-patient is completed first session of dialysis on 11/5/2022     on PhosLo    Nausea- (present on admission)  Assessment & Plan  Intractable     patient started on IV Reglan 5 mg every 6 hours titrated for her renal insufficiency        Pulmonary artery hypertension (HCC)- (present on admission)  Assessment & Plan  Estimated PA pressure 60 mmHg on recent echo, EF 65% grade 2 diastolic dysfunction moderate aortic stenosis moderate aortic regurg.  On room air, euvolemic.  Watch volume and respiratory status closely.  Continuous pulse ox    Diabetes mellitus (HCC)- (present on admission)  Assessment & Plan  Insulin sliding scale, diabetic diet, POC glucose checks    Anemia- (present on admission)  Assessment & Plan  No reports of hemorrhage, suspect secondary to chronic renal  disease.  Continue to monitor and transfuse for hemoglobin less than 7.    Leukocytosis- (present on admission)  Assessment & Plan  I suspect secondary to steroid administration for COPD.          Primary hypertension- (present on admission)  Assessment & Plan  Presented to OSH with systolic blood pressure in the 200s, on Coreg and amlodipine at home, hydralazine has been added at OSH due to persistent hypertension.  Continue amlodipine, hydralazine, Coreg.  IV antihypertensives ordered with parameters.    Moderate aortic stenosis- (present on admission)  Assessment & Plan  Noted on TTE from OSH.  She is euvolemic, watch respiratory status and volume status closely.    Chronic heart failure with preserved ejection fraction (HCC)- (present on admission)  Assessment & Plan  Newly diagnosed HFpEF, TTE at OSH LVEF 65% PA pressure 60 mmHg grade 2 diastolic dysfunction moderate aortic stenosis moderate aortic regurg.      I will defer diuresis to renal MD    Continue with diuresis    Continue Coreg, statin  BNP on presentation at OSH 5700, troponin T 16    Chronic obstructive pulmonary disease with acute exacerbation (HCC)- (present on admission)  Assessment & Plan    11/5/2022 patient did have decreased sounds bilaterally and complaining of some mild dyspnea-patient started on low-dose prednisone 20 mg p.o. daily for next 4 days    On 11/6/2022.  Noted increased tachypnea    Continue with prednisone and bronchodilators and oxygen as needed to maintain a sat greater than 90       VTE prophylaxis: SCDs/TEDs    I have performed a physical exam and reviewed and updated ROS and Plan today (11/6/2022). In review of yesterday's note (11/5/2022), there are no changes except as documented above.

## 2022-11-06 NOTE — PROGRESS NOTES
Assumed care of patient at 0700 from Maris HENRY. Patient is AO4, states pain level of 0/10. Patient stating she is feeling nauseous and was medicated per MAR. Bed is locked in lowest position, upper bed rails are up, call light and personal belongings are within reach. Bed alarm is not appropriate at this time and nonskid socks are being worn. Family is at bedside. Hourly rounding is in place.

## 2022-11-06 NOTE — PROGRESS NOTES
Hemodialysis #1 ordered by Dr. Joseph. Treatment started at 1650 and ended at 1920. Pt stable, vss, no c/o post tx. Net UF 1.0 L. Report to KALPESH Archer RN.

## 2022-11-06 NOTE — PROGRESS NOTES
Pt arrives to unit from dialysis via stretcher. Pt is sleeping, fatigued, and responds to verbal stimuli. Pt is a/o/2-3. Fall precautions in place. Call light within reach, bed lowest position. Sp02 monitor on currently on 2 L nc slight tachypnea noted. Dressing to right chest changed per protocol as blood noted to site.

## 2022-11-07 ENCOUNTER — APPOINTMENT (OUTPATIENT)
Dept: RADIOLOGY | Facility: MEDICAL CENTER | Age: 72
DRG: 674 | End: 2022-11-07
Attending: HOSPITALIST
Payer: MEDICARE

## 2022-11-07 LAB
ALBUMIN SERPL BCP-MCNC: 4 G/DL (ref 3.2–4.9)
BUN SERPL-MCNC: 37 MG/DL (ref 8–22)
CALCIUM SERPL-MCNC: 8.5 MG/DL (ref 8.5–10.5)
CHLORIDE SERPL-SCNC: 96 MMOL/L (ref 96–112)
CO2 SERPL-SCNC: 27 MMOL/L (ref 20–33)
CREAT SERPL-MCNC: 2.59 MG/DL (ref 0.5–1.4)
GAMMA INTERFERON BACKGROUND BLD IA-ACNC: 0.03 IU/ML
GFR SERPLBLD CREATININE-BSD FMLA CKD-EPI: 19 ML/MIN/1.73 M 2
GLUCOSE BLD STRIP.AUTO-MCNC: 126 MG/DL (ref 65–99)
GLUCOSE BLD STRIP.AUTO-MCNC: 162 MG/DL (ref 65–99)
GLUCOSE BLD STRIP.AUTO-MCNC: 215 MG/DL (ref 65–99)
GLUCOSE SERPL-MCNC: 106 MG/DL (ref 65–99)
M TB IFN-G BLD-IMP: NEGATIVE
M TB IFN-G CD4+ BCKGRND COR BLD-ACNC: 0 IU/ML
MITOGEN IGNF BCKGRD COR BLD-ACNC: >10 IU/ML
PHOSPHATE SERPL-MCNC: 3.8 MG/DL (ref 2.5–4.5)
POTASSIUM SERPL-SCNC: 4 MMOL/L (ref 3.6–5.5)
QFT TB2 - NIL TBQ2: -0.01 IU/ML
SODIUM SERPL-SCNC: 136 MMOL/L (ref 135–145)

## 2022-11-07 PROCEDURE — 700111 HCHG RX REV CODE 636 W/ 250 OVERRIDE (IP): Performed by: STUDENT IN AN ORGANIZED HEALTH CARE EDUCATION/TRAINING PROGRAM

## 2022-11-07 PROCEDURE — 770006 HCHG ROOM/CARE - MED/SURG/GYN SEMI*

## 2022-11-07 PROCEDURE — 99232 SBSQ HOSP IP/OBS MODERATE 35: CPT | Performed by: HOSPITALIST

## 2022-11-07 PROCEDURE — 80069 RENAL FUNCTION PANEL: CPT

## 2022-11-07 PROCEDURE — 700102 HCHG RX REV CODE 250 W/ 637 OVERRIDE(OP): Performed by: STUDENT IN AN ORGANIZED HEALTH CARE EDUCATION/TRAINING PROGRAM

## 2022-11-07 PROCEDURE — 82962 GLUCOSE BLOOD TEST: CPT | Mod: 91

## 2022-11-07 PROCEDURE — 700111 HCHG RX REV CODE 636 W/ 250 OVERRIDE (IP): Performed by: HOSPITALIST

## 2022-11-07 PROCEDURE — 700111 HCHG RX REV CODE 636 W/ 250 OVERRIDE (IP)

## 2022-11-07 PROCEDURE — 90935 HEMODIALYSIS ONE EVALUATION: CPT

## 2022-11-07 PROCEDURE — A9270 NON-COVERED ITEM OR SERVICE: HCPCS | Performed by: HOSPITALIST

## 2022-11-07 PROCEDURE — 71045 X-RAY EXAM CHEST 1 VIEW: CPT

## 2022-11-07 PROCEDURE — 36415 COLL VENOUS BLD VENIPUNCTURE: CPT

## 2022-11-07 PROCEDURE — 94760 N-INVAS EAR/PLS OXIMETRY 1: CPT

## 2022-11-07 PROCEDURE — A9270 NON-COVERED ITEM OR SERVICE: HCPCS | Performed by: STUDENT IN AN ORGANIZED HEALTH CARE EDUCATION/TRAINING PROGRAM

## 2022-11-07 PROCEDURE — 700102 HCHG RX REV CODE 250 W/ 637 OVERRIDE(OP): Performed by: HOSPITALIST

## 2022-11-07 RX ORDER — METOCLOPRAMIDE 10 MG/1
5 TABLET ORAL 3 TIMES DAILY
Status: DISCONTINUED | OUTPATIENT
Start: 2022-11-07 | End: 2022-11-09 | Stop reason: HOSPADM

## 2022-11-07 RX ORDER — HEPARIN SODIUM 1000 [USP'U]/ML
INJECTION, SOLUTION INTRAVENOUS; SUBCUTANEOUS
Status: COMPLETED
Start: 2022-11-07 | End: 2022-11-07

## 2022-11-07 RX ADMIN — PREDNISONE 20 MG: 20 TABLET ORAL at 05:31

## 2022-11-07 RX ADMIN — HYDRALAZINE HYDROCHLORIDE 100 MG: 50 TABLET, FILM COATED ORAL at 21:31

## 2022-11-07 RX ADMIN — AMLODIPINE BESYLATE 10 MG: 10 TABLET ORAL at 04:25

## 2022-11-07 RX ADMIN — CARVEDILOL 25 MG: 25 TABLET, FILM COATED ORAL at 08:38

## 2022-11-07 RX ADMIN — METOCLOPRAMIDE 5 MG: 5 INJECTION, SOLUTION INTRAMUSCULAR; INTRAVENOUS at 05:31

## 2022-11-07 RX ADMIN — HYDRALAZINE HYDROCHLORIDE 100 MG: 50 TABLET, FILM COATED ORAL at 13:10

## 2022-11-07 RX ADMIN — HEPARIN SODIUM 5000 UNITS: 5000 INJECTION, SOLUTION INTRAVENOUS; SUBCUTANEOUS at 21:30

## 2022-11-07 RX ADMIN — HEPARIN SODIUM 3300 UNITS: 1000 INJECTION, SOLUTION INTRAVENOUS; SUBCUTANEOUS at 19:55

## 2022-11-07 RX ADMIN — METOCLOPRAMIDE 5 MG: 5 INJECTION, SOLUTION INTRAMUSCULAR; INTRAVENOUS at 00:22

## 2022-11-07 RX ADMIN — Medication 667 MG: at 13:10

## 2022-11-07 RX ADMIN — HYDRALAZINE HYDROCHLORIDE 100 MG: 50 TABLET, FILM COATED ORAL at 04:25

## 2022-11-07 RX ADMIN — EZETIMIBE 10 MG: 10 TABLET ORAL at 05:31

## 2022-11-07 RX ADMIN — HEPARIN SODIUM 5000 UNITS: 5000 INJECTION, SOLUTION INTRAVENOUS; SUBCUTANEOUS at 13:12

## 2022-11-07 RX ADMIN — OMEPRAZOLE 20 MG: 20 CAPSULE, DELAYED RELEASE ORAL at 04:25

## 2022-11-07 RX ADMIN — INSULIN HUMAN 4 UNITS: 100 INJECTION, SOLUTION PARENTERAL at 13:04

## 2022-11-07 RX ADMIN — METOCLOPRAMIDE 5 MG: 10 TABLET ORAL at 21:31

## 2022-11-07 RX ADMIN — Medication 667 MG: at 08:38

## 2022-11-07 RX ADMIN — PAROXETINE HYDROCHLORIDE 10 MG: 10 TABLET, FILM COATED ORAL at 04:25

## 2022-11-07 RX ADMIN — INSULIN HUMAN 3 UNITS: 100 INJECTION, SOLUTION PARENTERAL at 21:31

## 2022-11-07 RX ADMIN — METOCLOPRAMIDE 5 MG: 10 TABLET ORAL at 13:10

## 2022-11-07 RX ADMIN — HEPARIN SODIUM 5000 UNITS: 5000 INJECTION, SOLUTION INTRAVENOUS; SUBCUTANEOUS at 04:25

## 2022-11-07 RX ADMIN — ACETAMINOPHEN 650 MG: 325 TABLET, FILM COATED ORAL at 21:11

## 2022-11-07 RX ADMIN — TRAZODONE HYDROCHLORIDE 50 MG: 50 TABLET ORAL at 21:11

## 2022-11-07 ASSESSMENT — ENCOUNTER SYMPTOMS
CHILLS: 0
ORTHOPNEA: 0
DEPRESSION: 0
MYALGIAS: 0
HEMOPTYSIS: 0
HEADACHES: 0
BACK PAIN: 0
TINGLING: 0
COUGH: 0
SENSORY CHANGE: 0
TREMORS: 0
DOUBLE VISION: 0
NAUSEA: 0
BLURRED VISION: 0
ABDOMINAL PAIN: 0
VOMITING: 0
PHOTOPHOBIA: 0
SHORTNESS OF BREATH: 1
FEVER: 0
HEARTBURN: 0
DIZZINESS: 0
PALPITATIONS: 0
CLAUDICATION: 0
SPUTUM PRODUCTION: 0

## 2022-11-07 ASSESSMENT — COGNITIVE AND FUNCTIONAL STATUS - GENERAL
SUGGESTED CMS G CODE MODIFIER MOBILITY: CH
DAILY ACTIVITIY SCORE: 23
SUGGESTED CMS G CODE MODIFIER DAILY ACTIVITY: CI
TOILETING: A LITTLE
MOBILITY SCORE: 24

## 2022-11-07 ASSESSMENT — PAIN DESCRIPTION - PAIN TYPE
TYPE: ACUTE PAIN

## 2022-11-07 ASSESSMENT — LIFESTYLE VARIABLES: SUBSTANCE_ABUSE: 0

## 2022-11-07 NOTE — DISCHARGE PLANNING
Case Management Discharge Planning    Admission Date: 11/3/2022  GMLOS: 6  ALOS: 4    6-Clicks ADL Score: 23  6-Clicks Mobility Score: 24      Anticipated Discharge Dispo: Discharge Disposition: Discharged to home/self care (01)    DME Needed: No    Action(s) Taken: Updated Provider/Nurse on Discharge Plan and OTHER    Escalations Completed: None    Medically Clear: No    Next Steps: CM discussed pt during IDT rounds this am with team and MD. Pt is not medically cleared at this time, however, when she is cleared for discharge pt will require OP HD. CM called Jayla Gutierres to start referral process and she will be notifying pt. CM will follow for all dc needs.     Barriers to Discharge: Medical clearance, Outpatient referrals pending, and Dialysis    Is the patient up for discharge tomorrow: No

## 2022-11-07 NOTE — PROGRESS NOTES
[facility] -  PROGRESS NOTE               Author: Theo García M.D. Date & Time: 11/7/2022  11:05 AM     HPI:  Ms. Galloway is a 71 yo F who presented to OSH on 10/30/2022 with chief complaint of cough and shortness of breath. Cough and shortness of breath been ongoing since starting nifedipine 3 weeks prior, medication was stopped however symptoms persisted. She had a productive cough no fevers wheezing orthopnea. Denies chest pain fever sick contacts urinary symptoms vomiting diarrhea abdominal pain. She is a former smoker with COPD not on on home oxygen therapy. She has also has h/o hypertension, CKD stage IV, and diabetes mellitus.  When she initially presented her Serum creatinine was 3.83. On hospital day 2 creatinine worsened to 4.7, noted to have good urine output.  She was given 1 L bolus repeat creatinine and labs showed worsening creatinine up to 4.9.  She was given Lasix 40 mg IV daily with albumin 25 g IV (ultimately for 3 days with progression of renal failure).  On hospital day 3 creatinine elevated to 5, hospital day 4 creatinine 5.3 serum bicarb 18 urine output 725 cc/in prior 24 hours. She was transferred to [facility] 11/3/2022 from [facility] for progressive renal failure. Nephrology was asked to consult. She does have some Nausea but no vomiting. Her baseline scr is ~ 3-3.5.     Echocardiogram obtained  from OSH showed EF 65% PA pressure 60 mmHg, grade 2 diastolic dysfunction, moderate aortic stenosis, moderate aortic regurgitation.  She has been receiving methylprednisolone 40 mg every 8 hours.  Renal ultrasound from OSH shows no abnormalities.     DAILY NEPHROLOGY SUMMARY:  11/05 - Scr unchanged from yesterday. Non-oliguric. Feels worse than yesterday.  11/06 - Pt sitting up in bed, family at bedside, feels general malaise and nausea after first HD yesterday with 1L net UF, has RIJ TDC and new LUE AVF, labs reviewed, BP elevated, resp status stable on 2L NC O2,  "1.2L UOP recorded  11/07: Pt resting comfortable, no issues reproted    REVIEW OF SYSTEMS:    10 point ROS reviewed and is as per HPI/daily summary or otherwise negative    PMH/PSH/SH/FH:   Reviewed and unchanged since admission note    CURRENT MEDICATIONS:   Reviewed from admission to present day    VS:  /61   Pulse 89   Temp 36.5 °C (97.7 °F) (Temporal)   Resp 18   Ht 1.575 m (5' 2\")   Wt 51.5 kg (113 lb 8.6 oz)   SpO2 95%   BMI 20.77 kg/m²     Physical Exam  Constitutional:       Appearance: Normal appearance.   HENT:      Head: Normocephalic and atraumatic.      Nose: Nose normal.      Mouth/Throat:      Mouth: Mucous membranes are moist.      Pharynx: Oropharynx is clear.   Eyes:      Extraocular Movements: Extraocular movements intact.      Conjunctiva/sclera: Conjunctivae normal.      Pupils: Pupils are equal, round, and reactive to light.   Cardiovascular:      Rate and Rhythm: Normal rate and regular rhythm.      Pulses: Normal pulses.      Heart sounds: Normal heart sounds.      Comments: Immature LUE AVF  RIJ TDC CDI  Pulmonary:      Effort: Pulmonary effort is normal.      Breath sounds: Normal breath sounds.   Abdominal:      General: Abdomen is flat. Bowel sounds are normal.      Palpations: Abdomen is soft.   Musculoskeletal:         General: Normal range of motion.      Cervical back: Normal range of motion.   Skin:     General: Skin is warm.      Capillary Refill: Capillary refill takes less than 2 seconds.   Neurological:      General: No focal deficit present.      Mental Status: She is alert and oriented to person, place, and time. Mental status is at baseline.   Psychiatric:         Mood and Affect: Mood normal.         Behavior: Behavior normal.         Thought Content: Thought content normal.         Judgment: Judgment normal.       Fluids:  In: 500 [Dialysis:500]  Out: 1500     LABS:  Recent Labs     11/05/22  0104 11/05/22  1109 11/06/22  1017 11/07/22  0529   SODIUM 137  --  " 132* 136   POTASSIUM 4.0  --  4.9 4.0   CHLORIDE 99  --  96 96   CO2 21  --  18* 27   GLUCOSE 106*  --  124* 106*   BUN 99*  --  70* 37*   CREATININE 5.71*  --  3.76* 2.59*   CALCIUM 8.1* 8.4* 8.5 8.5         IMAGING:   All imaging reviewed from admission to present day    IMPRESSION:  # Likely progressive CKD -  now ESRD. + Uremic  - KRIS TDC and KERI AVF completed 11/5 with Dr. Hahn  - HD initiated 11/5/22  # CKD stage IV  # DM  # Anemia in CKD, at target  - %sat 90, ferritin 401  # HTN, labile control   # Moderate Aortic stenosis  # h/o CHF with preserved EF  # COPD  # Pulm HTN  # CKD-MBD  - On calcium acetate WM  - iPTH 183  - Phos 7.9      PLAN:  - HD today Monday, 3rd treatment  - UF as tolerated   - Dose all meds per ESRD/HD  - Hold diuretics for now  - Ordered Quant Gold and Hepatitis  - Continue phos binder, trend phos  - Will need OP HD chair in [place] secured prior to DC  - Avoid nephrotoxins/NSAIDs  - Transfuse PRN Hgb <7   - Labs in AM (MON)

## 2022-11-07 NOTE — CARE PLAN
The patient is Stable - Low risk of patient condition declining or worsening    Shift Goals  Clinical Goals: Rest  Patient Goals: Rest, Comfort  Family Goals: Rest    Progress made toward(s) clinical / shift goals:      Problem: Knowledge Deficit - Standard  Goal: Patient and family/care givers will demonstrate understanding of plan of care, disease process/condition, diagnostic tests and medications  Outcome: Progressing     Problem: Psychosocial Needs:  Goal: Ability to cope will improve  Outcome: Progressing       Patient is not progressing towards the following goals:

## 2022-11-07 NOTE — DISCHARGE PLANNING
Outpatient Dialysis Placement Notification    Received notification for outpatient dialysis placement from Dr. García.    Met/Spoke with patient and confirmed demographics and insurance information.  Provided patient with dialysis education materials and list of HD centers, referral initiated to:    [facility]    [address] [place] [address]    Pending medical and financial clearance.    Jayla Gutierres- Senior Dialysis Coordinator Ph# [phone]  [facility] [employer]

## 2022-11-07 NOTE — PROGRESS NOTES
[facility] Medicine Daily Progress Note    Date of Service  11/7/2022    Chief Complaint  Jesi Galloway is a 72 y.o. female admitted 11/3/2022 with worsening kidney function    Hospital Course  No notes on file    Interval Problem Update  Shortness of breath still present but slightly improved from yesterday    I ordered an x-ray and reviewed this x-ray did not show any acute abnormality.      After 2 sessions of dialysis patient's creatinine improved to 2.39    As per patient more dialysis sessions have been planned    Patient's nausea is improved      Patient states that her normal creatinine is around 3      I have discussed this patient's plan of care and discharge plan at IDT rounds today with Case Management, Nursing, Nursing leadership, and other members of the IDT team.    Consultants/Specialty  nephrology    Code Status  Full Code    Disposition  Patient is not medically cleared for discharge.   Anticipate discharge to to home with close outpatient follow-up.  I have placed the appropriate orders for post-discharge needs.    Review of Systems  Review of Systems   Constitutional:  Negative for chills and fever.   HENT:  Negative for ear pain, hearing loss and tinnitus.    Eyes:  Negative for blurred vision, double vision and photophobia.   Respiratory:  Positive for shortness of breath. Negative for cough, hemoptysis and sputum production.    Cardiovascular:  Negative for chest pain, palpitations, orthopnea and claudication.   Gastrointestinal:  Negative for abdominal pain, heartburn, nausea and vomiting.   Genitourinary:  Negative for dysuria, frequency and urgency.   Musculoskeletal:  Negative for back pain and myalgias.   Neurological:  Negative for dizziness, tingling, tremors, sensory change and headaches.   Psychiatric/Behavioral:  Negative for depression, substance abuse and suicidal ideas.    All other systems reviewed and are negative.     Physical Exam  Temp:  [36.2 °C (97.2 °F)-36.9 °C  (98.5 °F)] 36.5 °C (97.7 °F)  Pulse:  [72-89] 89  Resp:  [16-18] 18  BP: (121-184)/(51-80) 139/61  SpO2:  [94 %-96 %] 95 %    Physical Exam  Constitutional:       General: She is not in acute distress.     Appearance: She is not ill-appearing, toxic-appearing or diaphoretic.   HENT:      Head: Normocephalic.      Mouth/Throat:      Mouth: Mucous membranes are moist.   Eyes:      General: No scleral icterus.        Left eye: No discharge.      Extraocular Movements: Extraocular movements intact.      Pupils: Pupils are equal, round, and reactive to light.   Cardiovascular:      Rate and Rhythm: Normal rate.      Heart sounds: No murmur heard.    No gallop.   Pulmonary:      Effort: No respiratory distress.      Breath sounds: Rhonchi (Rhonchi noted on left) present. No wheezing or rales.      Comments: Decreased breath sounds bilaterally  Abdominal:      General: There is no distension.      Palpations: There is no mass.      Tenderness: There is no abdominal tenderness. There is no guarding or rebound.      Hernia: No hernia is present.   Musculoskeletal:         General: No swelling or deformity. Normal range of motion.      Cervical back: Normal range of motion. No rigidity.   Skin:     Capillary Refill: Capillary refill takes 2 to 3 seconds.      Coloration: Skin is not jaundiced.      Findings: No bruising or lesion.   Neurological:      General: No focal deficit present.      Cranial Nerves: No cranial nerve deficit.      Motor: No weakness.      Gait: Gait normal.   Psychiatric:         Mood and Affect: Mood normal.       Fluids    Intake/Output Summary (Last 24 hours) at 11/7/2022 1034  Last data filed at 11/7/2022 0852  Gross per 24 hour   Intake 500 ml   Output 1900 ml   Net -1400 ml         Laboratory  Recent Labs     11/05/22  0104 11/06/22  1017   WBC 8.3 11.7*   RBC 3.59* 3.58*   HEMOGLOBIN 10.5* 10.7*   HEMATOCRIT 33.4* 33.0*   MCV 93.0 92.2   MCH 29.2 29.9   MCHC 31.4* 32.4*   RDW 42.0 41.2    PLATELETCT 262 220   MPV 10.1 10.1       Recent Labs     11/05/22  0104 11/05/22  1109 11/06/22  1017 11/07/22  0529   SODIUM 137  --  132* 136   POTASSIUM 4.0  --  4.9 4.0   CHLORIDE 99  --  96 96   CO2 21  --  18* 27   GLUCOSE 106*  --  124* 106*   BUN 99*  --  70* 37*   CREATININE 5.71*  --  3.76* 2.59*   CALCIUM 8.1* 8.4* 8.5 8.5                     Imaging  DX-CHEST-LIMITED (1 VIEW)   Final Result      1.  Supportive tubing as described above.   2.  No pneumothorax   3.  No other significant change from prior exam.         DX-PORTABLE FLUOROSCOPY < 1 HOUR   Final Result      Intraoperative fluoroscopy spot images as described above.      DX-CHEST-LIMITED (1 VIEW)   Final Result      No acute cardiopulmonary disease evident.             Assessment/Plan  * Acute renal failure superimposed on stage 4 chronic kidney disease (HCC)- (present on admission)  Assessment & Plan  Unclear etiology, no recent renal toxic medications, no contrast, no episodes of hypotension.  Received fluid bolus while out OSH with worsening renal function followed by 3 days of daily IV lasix and albumin again with worsening renal function.    Renal US normal from OSH    Renal dose meds and avoid nephrotoxins  Monitor I&O's  Follow renal function    Nephrology MD on the case-patient is completed first session of dialysis on 11/5/2022     on PhosLo    Nausea- (present on admission)  Assessment & Plan  Intractable     patient started on IV Reglan 5 mg every 6 hours titrated for her renal insufficiency      On 11/7/2022 of transition Reglan to p.o.      Pulmonary artery hypertension (HCC)- (present on admission)  Assessment & Plan  Estimated PA pressure 60 mmHg on recent echo, EF 65% grade 2 diastolic dysfunction moderate aortic stenosis moderate aortic regurg.  On room air, euvolemic.  Watch volume and respiratory status closely.  Continuous pulse ox    Diabetes mellitus (HCC)- (present on admission)  Assessment & Plan  Insulin sliding  scale, diabetic diet, POC glucose checks    Anemia- (present on admission)  Assessment & Plan  No reports of hemorrhage, suspect secondary to chronic renal disease.  Continue to monitor and transfuse for hemoglobin less than 7.    Leukocytosis- (present on admission)  Assessment & Plan  I suspect secondary to steroid administration for COPD.          Primary hypertension- (present on admission)  Assessment & Plan  Presented to OSH with systolic blood pressure in the 200s, on Coreg and amlodipine at home, hydralazine has been added at OSH due to persistent hypertension.  Continue amlodipine, hydralazine, Coreg.  IV antihypertensives ordered with parameters.    Moderate aortic stenosis- (present on admission)  Assessment & Plan  Noted on TTE from OSH.  She is euvolemic, watch respiratory status and volume status closely.    Chronic heart failure with preserved ejection fraction (HCC)- (present on admission)  Assessment & Plan  Newly diagnosed HFpEF, TTE at OSH LVEF 65% PA pressure 60 mmHg grade 2 diastolic dysfunction moderate aortic stenosis moderate aortic regurg.      I will defer diuresis to renal MD    Continue with diuresis    Continue Coreg, statin  BNP on presentation at OSH 5700, troponin T 16    Chronic obstructive pulmonary disease with acute exacerbation (HCC)- (present on admission)  Assessment & Plan    11/5/2022 patient did have decreased sounds bilaterally and complaining of some mild dyspnea-patient started on low-dose prednisone 20 mg p.o. daily for next 4 days    On 11/6/2022.  Noted increased tachypnea    Continue with prednisone and bronchodilators and oxygen as needed to maintain a sat greater than 90    11/6/2022 x-ray of the chest and not show any acute abnormality    Patient's breathing pattern improved slightly       VTE prophylaxis: SCDs/TEDs    I have performed a physical exam and reviewed and updated ROS and Plan today (11/7/2022). In review of yesterday's note (11/6/2022), there are no  changes except as documented above.

## 2022-11-07 NOTE — PROGRESS NOTES
Assumed care of pt  at 1900. Report received from Day RN. Pt is A&O x 4. Patient denies pain at this time. Bed in lowest locked position, call light within reach, hourly rounding in place. Labs reviewed, orders reviewed, communication board updated. Pt and pt's family asked for the one time dose of ativan.

## 2022-11-07 NOTE — ANESTHESIA POSTPROCEDURE EVALUATION
Patient: Jesi Galloway    Procedure Summary     Date: 11/05/22 Room / Location: [place] [id number] / SURGERY [facility]    Anesthesia Start: 1342 Anesthesia Stop: 1524    Procedures:       INSERTION, CATHETER (Right: Chest)      CREATION, AV FISTULA (Left: Arm Lower) Diagnosis: (ESRD in need of long term dialysis access)    Surgeons: Ronnie Hahn M.D. Responsible Provider: Bret Goldberg M.D.    Anesthesia Type: general ASA Status: 3          Final Anesthesia Type: general  Last vitals  BP   Blood Pressure : 139/61    Temp   36.5 °C (97.7 °F)    Pulse   89   Resp   18    SpO2   95 %      Anesthesia Post Evaluation    Patient location during evaluation: PACU  Patient participation: complete - patient participated  Level of consciousness: awake and alert    Airway patency: patent  Anesthetic complications: no  Cardiovascular status: hemodynamically stable  Respiratory status: acceptable  Hydration status: euvolemic    PONV: none          There were no known notable events for this encounter.     Nurse Pain Score: 0 (NPRS)

## 2022-11-08 LAB
ANION GAP SERPL CALC-SCNC: 12 MMOL/L (ref 7–16)
BUN SERPL-MCNC: 21 MG/DL (ref 8–22)
CALCIUM SERPL-MCNC: 8.9 MG/DL (ref 8.5–10.5)
CHLORIDE SERPL-SCNC: 98 MMOL/L (ref 96–112)
CO2 SERPL-SCNC: 27 MMOL/L (ref 20–33)
CREAT SERPL-MCNC: 2.23 MG/DL (ref 0.5–1.4)
ERYTHROCYTE [DISTWIDTH] IN BLOOD BY AUTOMATED COUNT: 40.7 FL (ref 35.9–50)
GFR SERPLBLD CREATININE-BSD FMLA CKD-EPI: 23 ML/MIN/1.73 M 2
GLUCOSE BLD STRIP.AUTO-MCNC: 138 MG/DL (ref 65–99)
GLUCOSE BLD STRIP.AUTO-MCNC: 141 MG/DL (ref 65–99)
GLUCOSE BLD STRIP.AUTO-MCNC: 191 MG/DL (ref 65–99)
GLUCOSE BLD STRIP.AUTO-MCNC: 93 MG/DL (ref 65–99)
GLUCOSE SERPL-MCNC: 95 MG/DL (ref 65–99)
HCT VFR BLD AUTO: 34.9 % (ref 37–47)
HGB BLD-MCNC: 11 G/DL (ref 12–16)
MCH RBC QN AUTO: 29.1 PG (ref 27–33)
MCHC RBC AUTO-ENTMCNC: 31.5 G/DL (ref 33.6–35)
MCV RBC AUTO: 92.3 FL (ref 81.4–97.8)
PLATELET # BLD AUTO: 180 K/UL (ref 164–446)
PMV BLD AUTO: 9.6 FL (ref 9–12.9)
POTASSIUM SERPL-SCNC: 3.8 MMOL/L (ref 3.6–5.5)
RBC # BLD AUTO: 3.78 M/UL (ref 4.2–5.4)
SODIUM SERPL-SCNC: 137 MMOL/L (ref 135–145)
WBC # BLD AUTO: 10.7 K/UL (ref 4.8–10.8)

## 2022-11-08 PROCEDURE — 700111 HCHG RX REV CODE 636 W/ 250 OVERRIDE (IP): Performed by: STUDENT IN AN ORGANIZED HEALTH CARE EDUCATION/TRAINING PROGRAM

## 2022-11-08 PROCEDURE — 80048 BASIC METABOLIC PNL TOTAL CA: CPT

## 2022-11-08 PROCEDURE — 700102 HCHG RX REV CODE 250 W/ 637 OVERRIDE(OP): Performed by: HOSPITALIST

## 2022-11-08 PROCEDURE — A9270 NON-COVERED ITEM OR SERVICE: HCPCS | Performed by: HOSPITALIST

## 2022-11-08 PROCEDURE — A9270 NON-COVERED ITEM OR SERVICE: HCPCS | Performed by: STUDENT IN AN ORGANIZED HEALTH CARE EDUCATION/TRAINING PROGRAM

## 2022-11-08 PROCEDURE — 700111 HCHG RX REV CODE 636 W/ 250 OVERRIDE (IP): Performed by: HOSPITALIST

## 2022-11-08 PROCEDURE — 85027 COMPLETE CBC AUTOMATED: CPT

## 2022-11-08 PROCEDURE — 700102 HCHG RX REV CODE 250 W/ 637 OVERRIDE(OP): Performed by: STUDENT IN AN ORGANIZED HEALTH CARE EDUCATION/TRAINING PROGRAM

## 2022-11-08 PROCEDURE — 770006 HCHG ROOM/CARE - MED/SURG/GYN SEMI*

## 2022-11-08 PROCEDURE — 36415 COLL VENOUS BLD VENIPUNCTURE: CPT

## 2022-11-08 PROCEDURE — 82962 GLUCOSE BLOOD TEST: CPT | Mod: 91

## 2022-11-08 PROCEDURE — 99232 SBSQ HOSP IP/OBS MODERATE 35: CPT | Performed by: STUDENT IN AN ORGANIZED HEALTH CARE EDUCATION/TRAINING PROGRAM

## 2022-11-08 RX ORDER — BUDESONIDE AND FORMOTEROL FUMARATE DIHYDRATE 160; 4.5 UG/1; UG/1
2 AEROSOL RESPIRATORY (INHALATION)
Status: DISCONTINUED | OUTPATIENT
Start: 2022-11-08 | End: 2022-11-09 | Stop reason: HOSPADM

## 2022-11-08 RX ADMIN — AMLODIPINE BESYLATE 10 MG: 10 TABLET ORAL at 05:42

## 2022-11-08 RX ADMIN — METOCLOPRAMIDE 5 MG: 10 TABLET ORAL at 22:11

## 2022-11-08 RX ADMIN — PAROXETINE HYDROCHLORIDE 10 MG: 10 TABLET, FILM COATED ORAL at 05:41

## 2022-11-08 RX ADMIN — OMEPRAZOLE 20 MG: 20 CAPSULE, DELAYED RELEASE ORAL at 05:41

## 2022-11-08 RX ADMIN — Medication 667 MG: at 17:20

## 2022-11-08 RX ADMIN — HYDRALAZINE HYDROCHLORIDE 100 MG: 50 TABLET, FILM COATED ORAL at 22:11

## 2022-11-08 RX ADMIN — METOCLOPRAMIDE 5 MG: 10 TABLET ORAL at 09:45

## 2022-11-08 RX ADMIN — CARVEDILOL 25 MG: 25 TABLET, FILM COATED ORAL at 17:20

## 2022-11-08 RX ADMIN — Medication 667 MG: at 07:39

## 2022-11-08 RX ADMIN — INSULIN HUMAN 3 UNITS: 100 INJECTION, SOLUTION PARENTERAL at 17:49

## 2022-11-08 RX ADMIN — HYDRALAZINE HYDROCHLORIDE 100 MG: 50 TABLET, FILM COATED ORAL at 05:42

## 2022-11-08 RX ADMIN — OMEPRAZOLE 20 MG: 20 CAPSULE, DELAYED RELEASE ORAL at 17:20

## 2022-11-08 RX ADMIN — HEPARIN SODIUM 5000 UNITS: 5000 INJECTION, SOLUTION INTRAVENOUS; SUBCUTANEOUS at 14:11

## 2022-11-08 RX ADMIN — PREDNISONE 20 MG: 20 TABLET ORAL at 05:42

## 2022-11-08 RX ADMIN — EZETIMIBE 10 MG: 10 TABLET ORAL at 05:41

## 2022-11-08 RX ADMIN — HEPARIN SODIUM 5000 UNITS: 5000 INJECTION, SOLUTION INTRAVENOUS; SUBCUTANEOUS at 05:42

## 2022-11-08 RX ADMIN — METOCLOPRAMIDE 5 MG: 10 TABLET ORAL at 14:11

## 2022-11-08 RX ADMIN — TRAZODONE HYDROCHLORIDE 50 MG: 50 TABLET ORAL at 22:11

## 2022-11-08 RX ADMIN — CARVEDILOL 25 MG: 25 TABLET, FILM COATED ORAL at 07:39

## 2022-11-08 RX ADMIN — Medication 667 MG: at 12:17

## 2022-11-08 ASSESSMENT — PAIN DESCRIPTION - PAIN TYPE
TYPE: ACUTE PAIN

## 2022-11-08 ASSESSMENT — ENCOUNTER SYMPTOMS
BLURRED VISION: 0
HEADACHES: 0
COUGH: 0
PHOTOPHOBIA: 0
ORTHOPNEA: 0
FEVER: 0
NAUSEA: 0
TREMORS: 0
TINGLING: 0
CLAUDICATION: 0
MYALGIAS: 0
DIZZINESS: 0
HEMOPTYSIS: 0
BACK PAIN: 0
ABDOMINAL PAIN: 0
HEARTBURN: 0
DOUBLE VISION: 0
SHORTNESS OF BREATH: 1
VOMITING: 0
PALPITATIONS: 0
SENSORY CHANGE: 0
CHILLS: 0
DEPRESSION: 0
SPUTUM PRODUCTION: 0

## 2022-11-08 ASSESSMENT — LIFESTYLE VARIABLES: SUBSTANCE_ABUSE: 0

## 2022-11-08 NOTE — PROGRESS NOTES
[facility] Progress Note    Hemodialysis treatment ordered today per Dr. García x * hours. Treatment initiated at 1656 and ended at 1956.      Pt was hypertensive during HD,denies any discomfort during treatment, SBP mostly 160's, stable.; see e-flow sheets for details.    Net UF 1,500 mL    Post tx, CVC flushed with saline then locked with heparin 1000 units/mL per designated amount in each wing then clamped and capped. Aspirate heparin prior to next CVC use.    Report given to Primary RN.                       3

## 2022-11-08 NOTE — DISCHARGE INSTR - CASE MGT
Patient has been placed and confirmed at:     [facility]   [address] [place] [address]     # [phone]     Schedule: Monday, Wednesday, Friday   Time: 3:15pm     Patient can start on 11/11 and needs to be there by 2:30pm on your first day.

## 2022-11-08 NOTE — PROGRESS NOTES
[facility] -  PROGRESS NOTE               Author: JACQUELINE Sherwood Date & Time: 11/8/2022  12:01 PM     HPI:  Ms. Galloway is a 73 yo F who presented to OSH on 10/30/2022 with chief complaint of cough and shortness of breath. Cough and shortness of breath been ongoing since starting nifedipine 3 weeks prior, medication was stopped however symptoms persisted. She had a productive cough no fevers wheezing orthopnea. Denies chest pain fever sick contacts urinary symptoms vomiting diarrhea abdominal pain. She is a former smoker with COPD not on on home oxygen therapy. She has also has h/o hypertension, CKD stage IV, and diabetes mellitus.  When she initially presented her Serum creatinine was 3.83. On hospital day 2 creatinine worsened to 4.7, noted to have good urine output.  She was given 1 L bolus repeat creatinine and labs showed worsening creatinine up to 4.9.  She was given Lasix 40 mg IV daily with albumin 25 g IV (ultimately for 3 days with progression of renal failure).  On hospital day 3 creatinine elevated to 5, hospital day 4 creatinine 5.3 serum bicarb 18 urine output 725 cc/in prior 24 hours. She was transferred to [facility] 11/3/2022 from [facility] for progressive renal failure. Nephrology was asked to consult. She does have some Nausea but no vomiting. Her baseline scr is ~ 3-3.5.     Echocardiogram obtained  from OSH showed EF 65% PA pressure 60 mmHg, grade 2 diastolic dysfunction, moderate aortic stenosis, moderate aortic regurgitation.  She has been receiving methylprednisolone 40 mg every 8 hours.  Renal ultrasound from OSH shows no abnormalities.     DAILY NEPHROLOGY SUMMARY:  11/05 - Scr unchanged from yesterday. Non-oliguric. Feels worse than yesterday.  11/06 - Pt sitting up in bed, family at bedside, feels general malaise and nausea after first HD yesterday with 1L net UF, has RIJ TDC and new LUE AVF, labs reviewed, BP elevated, resp status stable on 2L NC O2,  "1.2L UOP recorded  11/07: Pt resting comfortable, no issues reproted  11/08: 1.5 Net UF.  at BS. Anxious to go home. C/o nausea with poor appetite. Denies CP/SOB.     REVIEW OF SYSTEMS:    10 point ROS reviewed and is as per HPI/daily summary or otherwise negative    PMH/PSH/SH/FH:   Reviewed and unchanged since admission note    CURRENT MEDICATIONS:   Reviewed from admission to present day    VS:  BP (!) 151/72 Comment: Rn notified   Pulse 89   Temp 36.4 °C (97.6 °F) (Temporal)   Resp 15   Ht 1.575 m (5' 2\")   Wt 51.5 kg (113 lb 8.6 oz)   SpO2 97%   BMI 20.77 kg/m²     Physical Exam  Constitutional:       Appearance: Normal appearance.   HENT:      Head: Normocephalic and atraumatic.      Nose: Nose normal.      Mouth/Throat:      Mouth: Mucous membranes are moist.      Pharynx: Oropharynx is clear.   Eyes:      Extraocular Movements: Extraocular movements intact.      Conjunctiva/sclera: Conjunctivae normal.      Pupils: Pupils are equal, round, and reactive to light.   Cardiovascular:      Rate and Rhythm: Normal rate and regular rhythm.      Pulses: Normal pulses.      Heart sounds: Normal heart sounds.      Comments: Immature LUE AVF  RIJ TDC CDI  Pulmonary:      Effort: Pulmonary effort is normal.      Breath sounds: Normal breath sounds.   Abdominal:      General: Abdomen is flat. Bowel sounds are normal.      Palpations: Abdomen is soft.   Musculoskeletal:         General: Normal range of motion.      Cervical back: Normal range of motion.   Skin:     General: Skin is warm.      Capillary Refill: Capillary refill takes less than 2 seconds.   Neurological:      General: No focal deficit present.      Mental Status: She is alert and oriented to person, place, and time. Mental status is at baseline.   Psychiatric:         Mood and Affect: Mood normal.         Behavior: Behavior normal.         Thought Content: Thought content normal.         Judgment: Judgment normal.       Fluids:  In: 740 " [P.O.:240; Dialysis:500]  Out: 2400     LABS:  Recent Labs     11/06/22  1017 11/07/22  0529 11/08/22  0320   SODIUM 132* 136 137   POTASSIUM 4.9 4.0 3.8   CHLORIDE 96 96 98   CO2 18* 27 27   GLUCOSE 124* 106* 95   BUN 70* 37* 21   CREATININE 3.76* 2.59* 2.23*   CALCIUM 8.5 8.5 8.9         IMAGING:   All imaging reviewed from admission to present day    IMPRESSION:  # Likely progressive CKD -  now ESRD. + Uremic  - KRIS TDC and KERI AVF completed 11/5 with Dr. Hahn  - HD initiated 11/5/22, Day #3 11/7  # CKD stage IV  # DM  # Anemia in CKD, at target  - %sat 90, ferritin 401  # HTN, labile control   # Moderate Aortic stenosis  # h/o CHF with preserved EF  # COPD  # Pulm HTN  # CKD-MBD  - On calcium acetate WM  - iPTH 183  - Phos 7.9 -> 3.8     PLAN:  - No iHD today (TUES), cont QMWF  - UF as tolerated   - Dose all meds per ESRD/HD  - Hold diuretics for now  - Ordered Quant Gold and Hepatitis  - Continue phos binder, trend phos  - Will need OP HD chair in [place] secured prior to DC  - Avoid nephrotoxins/NSAIDs  - Transfuse PRN Hgb <7   - Labs in AM (MON)

## 2022-11-08 NOTE — CARE PLAN
The patient is Stable - Low risk of patient condition declining or worsening    Shift Goals  Clinical Goals: Rest  Patient Goals: Rest  Family Goals: Rest    Progress made toward(s) clinical / shift goals:      Problem: Knowledge Deficit - Standard  Goal: Patient and family/care givers will demonstrate understanding of plan of care, disease process/condition, diagnostic tests and medications  Outcome: Progressing     Problem: Psychosocial Needs:  Goal: Ability to cope will improve  Outcome: Progressing       Patient is not progressing towards the following goals:

## 2022-11-08 NOTE — DISCHARGE PLANNING
Case Management Discharge Planning    Admission Date: 11/3/2022  GMLOS: 6  ALOS: 5    6-Clicks ADL Score: 23  6-Clicks Mobility Score: 24      Anticipated Discharge Dispo: Discharge Disposition: Discharged to home/self care (01)    DME Needed: No    Action(s) Taken: Updated Provider/Nurse on Discharge Plan    Escalations Completed: None    Medically Clear: No    Next Steps: CM discussed dc planning with IDT team and MD this am. CM went to bedside to assess pt. Pt has oxygen on 3lpm NC, new for pt. CM placed pt on oximetry and removed 02 during assessment. Pt a/o x 4. During the assessment pt's 02 sats dropped from 93% RA to 88% RA. CM suggested to RN to ambulate pt with oximetry to see if pt's 02 sats drop below 88%. CM made Dr. Morel aware. Pt states she lives with , has great support at home with kids and grandkids and her horse. Pt does have PCP stated on facesheet. Pt does not have advanced directives and would like to have booklet, CM will print out and give to pt. Pt's expected plan after discharge is to go home with outpt [facility] in [place], schedule set for M-W-F @ 3:15pm. Pt denies any financial barriers and has prescription coverage with insurance. Pt is not medically cleared at this time for discharge. CM will continue to follow for DC needs.   CM attempted to call pt's significant other to give update, no answer, unable to leave message.   Choice obtained for Oxygen, [facility] will be provider.     Barriers to Discharge: Medical clearance, Dialysis, and Oxygen Delivery    Is the patient up for discharge tomorrow: No

## 2022-11-08 NOTE — PROGRESS NOTES
O2 Assessment:    Ambulating w/o O2 - 87%  Sitting w/o O2 - 89%    Ambulating with O2 - 93%  Sitting with O2 - 94%

## 2022-11-08 NOTE — DISCHARGE PLANNING
Outpatient Dialysis Placement Confirmation    Patient has been placed and confirmed at:    [facility]   [address] [place] [address]    Ph# [phone]    Schedule: Monday, Wednesday, Friday   Time: 3:15pm    Patient can start on 11/11 and needs to be there by 2:30pm on your first day.    Follow up call made to Lela CHISHOLM and relayed outpatient dialysis placement confirmation. Follow up message to Dr. García to notify of placement. Provided patient dialysis schedule welcome letter via fax to [facility] to give to patient.    Jayla Gutierres- Senior Dialysis Coordinator Ph# [phone]  [facility]

## 2022-11-08 NOTE — PROGRESS NOTES
Message to VAT team:    Hi, my patient in room [facility] 531-2 has order for ultrasound guided IV. Just wanted to follow up. Thank you

## 2022-11-08 NOTE — PROGRESS NOTES
Assumed care of pt  at 2000 from dialysis. Report received from Day RN. Pt is A&O x 4. Patient denies pain at this time. Bed in lowest locked position, call light within reach, hourly rounding in place. Labs reviewed, orders reviewed, communication board updated. Pt updated on plan of care and asked to call when getting OOB.

## 2022-11-08 NOTE — CARE PLAN
The patient is Stable - Low risk of patient condition declining or worsening    Shift Goals  Clinical Goals: Rest  Patient Goals: Patient comfort  Family Goals: Rest    Progress made toward(s) clinical / shift goals:    Problem: Knowledge Deficit - Standard  Goal: Patient and family/care givers will demonstrate understanding of plan of care, disease process/condition, diagnostic tests and medications  Outcome: Progressing     Problem: Hemodynamics  Goal: Patient's hemodynamics, fluid balance and neurologic status will be stable or improve  Outcome: Progressing     Problem: Urinary - Renal Perfusion  Goal: Ability to achieve and maintain adequate renal perfusion and functioning will improve  Outcome: Progressing       Patient is not progressing towards the following goals:

## 2022-11-09 ENCOUNTER — TELEPHONE (OUTPATIENT)
Dept: OTHER | Facility: MEDICAL CENTER | Age: 72
End: 2022-11-09
Payer: MEDICARE

## 2022-11-09 VITALS
SYSTOLIC BLOOD PRESSURE: 162 MMHG | TEMPERATURE: 98.8 F | HEART RATE: 87 BPM | OXYGEN SATURATION: 97 % | BODY MASS INDEX: 20.89 KG/M2 | DIASTOLIC BLOOD PRESSURE: 74 MMHG | RESPIRATION RATE: 18 BRPM | HEIGHT: 62 IN | WEIGHT: 113.54 LBS

## 2022-11-09 VITALS — HEART RATE: 72 BPM | OXYGEN SATURATION: 95 % | RESPIRATION RATE: 21 BRPM

## 2022-11-09 VITALS — OXYGEN SATURATION: 88 % | HEART RATE: 88 BPM | RESPIRATION RATE: 19 BRPM

## 2022-11-09 VITALS — OXYGEN SATURATION: 84 % | RESPIRATION RATE: 20 BRPM | HEART RATE: 95 BPM

## 2022-11-09 VITALS — OXYGEN SATURATION: 95 % | HEART RATE: 78 BPM | RESPIRATION RATE: 21 BRPM

## 2022-11-09 VITALS — HEART RATE: 92 BPM | RESPIRATION RATE: 22 BRPM | OXYGEN SATURATION: 84 %

## 2022-11-09 PROBLEM — R11.0 NAUSEA: Status: RESOLVED | Noted: 2022-11-06 | Resolved: 2022-11-09

## 2022-11-09 LAB — GLUCOSE BLD STRIP.AUTO-MCNC: 122 MG/DL (ref 65–99)

## 2022-11-09 PROCEDURE — A9270 NON-COVERED ITEM OR SERVICE: HCPCS | Performed by: HOSPITALIST

## 2022-11-09 PROCEDURE — 82962 GLUCOSE BLOOD TEST: CPT

## 2022-11-09 PROCEDURE — 99454 REM MNTR PHYSIOL PARAM 16-30: CPT

## 2022-11-09 PROCEDURE — 700102 HCHG RX REV CODE 250 W/ 637 OVERRIDE(OP): Performed by: HOSPITALIST

## 2022-11-09 PROCEDURE — 99453 REM MNTR PHYSIOL PARAM SETUP: CPT

## 2022-11-09 PROCEDURE — A9270 NON-COVERED ITEM OR SERVICE: HCPCS | Performed by: STUDENT IN AN ORGANIZED HEALTH CARE EDUCATION/TRAINING PROGRAM

## 2022-11-09 PROCEDURE — 99239 HOSP IP/OBS DSCHRG MGMT >30: CPT | Performed by: STUDENT IN AN ORGANIZED HEALTH CARE EDUCATION/TRAINING PROGRAM

## 2022-11-09 PROCEDURE — 700102 HCHG RX REV CODE 250 W/ 637 OVERRIDE(OP): Performed by: STUDENT IN AN ORGANIZED HEALTH CARE EDUCATION/TRAINING PROGRAM

## 2022-11-09 RX ORDER — CALCIUM ACETATE 667 MG/1
667 TABLET ORAL
Qty: 180 TABLET | Refills: 3 | Status: SHIPPED | OUTPATIENT
Start: 2022-11-09

## 2022-11-09 RX ORDER — OMEPRAZOLE 20 MG/1
20 CAPSULE, DELAYED RELEASE ORAL 2 TIMES DAILY
Qty: 28 CAPSULE | Refills: 0 | Status: SHIPPED | OUTPATIENT
Start: 2022-11-09 | End: 2022-11-23

## 2022-11-09 RX ORDER — BUDESONIDE AND FORMOTEROL FUMARATE DIHYDRATE 160; 4.5 UG/1; UG/1
2 AEROSOL RESPIRATORY (INHALATION) 2 TIMES DAILY
Qty: 10.2 G | Refills: 3 | Status: SHIPPED | OUTPATIENT
Start: 2022-11-09

## 2022-11-09 RX ORDER — METOCLOPRAMIDE 5 MG/1
5 TABLET ORAL 3 TIMES DAILY PRN
Qty: 15 TABLET | Refills: 0 | Status: SHIPPED | OUTPATIENT
Start: 2022-11-09 | End: 2022-11-14

## 2022-11-09 RX ADMIN — Medication 667 MG: at 10:22

## 2022-11-09 RX ADMIN — PAROXETINE HYDROCHLORIDE 10 MG: 10 TABLET, FILM COATED ORAL at 05:06

## 2022-11-09 RX ADMIN — HYDRALAZINE HYDROCHLORIDE 100 MG: 50 TABLET, FILM COATED ORAL at 05:06

## 2022-11-09 RX ADMIN — EZETIMIBE 10 MG: 10 TABLET ORAL at 05:06

## 2022-11-09 RX ADMIN — CARVEDILOL 25 MG: 25 TABLET, FILM COATED ORAL at 07:59

## 2022-11-09 RX ADMIN — METOCLOPRAMIDE 5 MG: 10 TABLET ORAL at 10:23

## 2022-11-09 RX ADMIN — AMLODIPINE BESYLATE 10 MG: 10 TABLET ORAL at 05:06

## 2022-11-09 RX ADMIN — OMEPRAZOLE 20 MG: 20 CAPSULE, DELAYED RELEASE ORAL at 05:06

## 2022-11-09 ASSESSMENT — PAIN DESCRIPTION - PAIN TYPE
TYPE: ACUTE PAIN

## 2022-11-09 NOTE — PROGRESS NOTES
[facility] Medicine Daily Progress Note    Date of Service  11/8/2022    Chief Complaint  Jesi Galloway is a 72 y.o. female admitted 11/3/2022 with worsening kidney function    Hospital Course  72-year-old female with past medical history of COPD not previously on home oxygen, hypertension, CKD stage IV, type 2 diabetes who presented to the hospital on 11/3/2022 as a transfer from [facility] for progressive renal failure.  Patient presented to outside hospital on 1030 with complaints of cough and shortness of breath patient was hypoxic requiring oxygen.  Chest x-ray was reported negative however she was significantly hypertensive with a systolic blood pressure over 200 on her arrival here she had no leukocytosis and her hemoglobin was 10.9 creatinine elevated at 3.83 with a BUN 43.  During her stay she was treated with doxycycline and Solu-Medrol for suspected COPD exacerbation.  While here she had worsening progressive renal failure nephrology was consulted and ultimately patient was started on hemodialysis.  Renal ultrasound was negative for obstruction.    Interval Problem Update  Patient seen and examined at bedside she is very eager for discharge states that she feels well.    -HD chair confirmation today, patient is scheduled to receive HD MWF in [place]  -Patient is still requiring oxygen,  3 L with ambulation this has been ordered.   -Symbicort ordered    Anticipate discharge home tomorrow with home oxygen      I have discussed this patient's plan of care and discharge plan at IDT rounds today with Case Management, Nursing, Nursing leadership, and other members of the IDT team.    Consultants/Specialty  nephrology    Code Status  Full Code    Disposition  Patient is medically cleared for discharge.   Anticipate discharge to to home with close outpatient follow-up.  I have placed the appropriate orders for post-discharge needs.    Review of Systems  Review of Systems   Constitutional:  Negative  for chills and fever.   HENT:  Negative for ear pain, hearing loss and tinnitus.    Eyes:  Negative for blurred vision, double vision and photophobia.   Respiratory:  Positive for shortness of breath. Negative for cough, hemoptysis and sputum production.    Cardiovascular:  Negative for chest pain, palpitations, orthopnea and claudication.   Gastrointestinal:  Negative for abdominal pain, heartburn, nausea and vomiting.   Genitourinary:  Negative for dysuria, frequency and urgency.   Musculoskeletal:  Negative for back pain and myalgias.   Neurological:  Negative for dizziness, tingling, tremors, sensory change and headaches.   Psychiatric/Behavioral:  Negative for depression, substance abuse and suicidal ideas.    All other systems reviewed and are negative.     Physical Exam  Temp:  [36.4 °C (97.6 °F)-37.6 °C (99.7 °F)] 36.4 °C (97.6 °F)  Pulse:  [83-89] 89  Resp:  [15-18] 15  BP: (138-180)/(70-92) 151/72  SpO2:  [87 %-99 %] 87 %    Physical Exam  Constitutional:       General: She is not in acute distress.     Appearance: She is not ill-appearing, toxic-appearing or diaphoretic.   HENT:      Head: Normocephalic.      Mouth/Throat:      Mouth: Mucous membranes are moist.   Eyes:      General: No scleral icterus.        Left eye: No discharge.      Extraocular Movements: Extraocular movements intact.      Pupils: Pupils are equal, round, and reactive to light.   Cardiovascular:      Rate and Rhythm: Normal rate.      Heart sounds: No murmur heard.    No gallop.   Pulmonary:      Effort: No respiratory distress.      Breath sounds: Rhonchi (Rhonchi noted on left) present. No wheezing or rales.      Comments: Decreased breath sounds bilaterally  Abdominal:      General: There is no distension.      Palpations: There is no mass.      Tenderness: There is no abdominal tenderness. There is no guarding or rebound.      Hernia: No hernia is present.   Musculoskeletal:         General: No swelling or deformity. Normal  range of motion.      Cervical back: Normal range of motion. No rigidity.   Skin:     Coloration: Skin is not jaundiced.      Findings: No bruising or lesion.   Neurological:      General: No focal deficit present.      Cranial Nerves: No cranial nerve deficit.      Motor: No weakness.      Gait: Gait normal.   Psychiatric:         Mood and Affect: Mood normal.       Fluids    Intake/Output Summary (Last 24 hours) at 11/8/2022 1618  Last data filed at 11/8/2022 1100  Gross per 24 hour   Intake 740 ml   Output 2000 ml   Net -1260 ml       Laboratory  Recent Labs     11/06/22  1017 11/08/22  0320   WBC 11.7* 10.7   RBC 3.58* 3.78*   HEMOGLOBIN 10.7* 11.0*   HEMATOCRIT 33.0* 34.9*   MCV 92.2 92.3   MCH 29.9 29.1   MCHC 32.4* 31.5*   RDW 41.2 40.7   PLATELETCT 220 180   MPV 10.1 9.6     Recent Labs     11/06/22  1017 11/07/22  0529 11/08/22  0320   SODIUM 132* 136 137   POTASSIUM 4.9 4.0 3.8   CHLORIDE 96 96 98   CO2 18* 27 27   GLUCOSE 124* 106* 95   BUN 70* 37* 21   CREATININE 3.76* 2.59* 2.23*   CALCIUM 8.5 8.5 8.9                   Imaging  DX-CHEST-LIMITED (1 VIEW)   Final Result      1.  Supportive tubing as described above.   2.  No pneumothorax   3.  No other significant change from prior exam.         DX-PORTABLE FLUOROSCOPY < 1 HOUR   Final Result      Intraoperative fluoroscopy spot images as described above.      DX-CHEST-LIMITED (1 VIEW)   Final Result      No acute cardiopulmonary disease evident.             Assessment/Plan  * Acute renal failure superimposed on stage 4 chronic kidney disease (HCC)- (present on admission)  Assessment & Plan  Unclear etiology, no recent renal toxic medications, no contrast, no episodes of hypotension.  Received fluid bolus while out OSH with worsening renal function followed by 3 days of daily IV lasix and albumin again with worsening renal function.  Renal US normal from OSH  Suspect progression of ESRD per nephrology  Renal dose meds and avoid nephrotoxins  Monitor  I&O's  Follow renal function  Patient has been set up with outpatient HD  Continue PhosLo    Nausea- (present on admission)  Assessment & Plan  Resolved    Pulmonary artery hypertension (HCC)- (present on admission)  Assessment & Plan  Estimated PA pressure 60 mmHg on recent echo, EF 65% grade 2 diastolic dysfunction moderate aortic stenosis moderate aortic regurg.  On room air, euvolemic.  Watch volume and respiratory status closely.  Continuous pulse ox    Diabetes mellitus (HCC)- (present on admission)  Assessment & Plan  Insulin sliding scale, diabetic diet, POC glucose checks    Anemia- (present on admission)  Assessment & Plan  No reports of hemorrhage, suspect secondary to chronic renal disease.  Continue to monitor and transfuse for hemoglobin less than 7.    Leukocytosis- (present on admission)  Assessment & Plan  I suspect secondary to steroid administration for COPD.          Primary hypertension- (present on admission)  Assessment & Plan  Presented to OSH with systolic blood pressure in the 200s, on Coreg and amlodipine at home, hydralazine has been added at OSH due to persistent hypertension.  Continue amlodipine, hydralazine, Coreg.  IV antihypertensives ordered with parameters.    Moderate aortic stenosis- (present on admission)  Assessment & Plan  Noted on TTE from OSH.  She is euvolemic, watch respiratory status and volume status closely.    Chronic heart failure with preserved ejection fraction (HCC)- (present on admission)  Assessment & Plan  Newly diagnosed HFpEF, TTE at OSH LVEF 65% PA pressure 60 mmHg grade 2 diastolic dysfunction moderate aortic stenosis moderate aortic regurg.  Continue cardiac medications,  Coreg, statin  Does not appear to be in acute exacerbation  Will need outpatient follow-up    Chronic obstructive pulmonary disease with acute exacerbation (HCC)- (present on admission)  Assessment & Plan  11/5/2022 patient did have decreased sounds bilaterally and complaining of some  mild dyspnea-patient started on low-dose prednisone 20 mg, last dose tomorrow  Continue oxygen needs patient will need O2 on discharge, will need outpatient follow-up with pulmonology  RT protocol         VTE prophylaxis: SCDs/TEDs    I have performed a physical exam and reviewed and updated ROS and Plan today (11/8/2022). In review of yesterday's note (11/7/2022), there are no changes except as documented above.

## 2022-11-09 NOTE — DISCHARGE PLANNING
Agency/Facility Name: [place] [id number]  Referral sent per Choice form at 1610    1615- DPA spoke to Rosa at [facility], when referral is received she will start a review.

## 2022-11-09 NOTE — FACE TO FACE
"Face to Face Note  -  Durable Medical Equipment    Jazz Morel M.D. - NPI: [id number]  I certify that this patient is under my care and that they had a durable medical equipment(DME)face to face encounter by myself that meets the physician DME face-to-face encounter requirements with this patient on:    Date of encounter:   Patient:                    MRN:                       YOB: 2022  Jesi Galloway  [id number]  1950     The encounter with the patient was in whole, or in part, for the following medical condition, which is the primary reason for durable medical equipment:  COPD    I certify that, based on my findings, the following durable medical equipment is medically necessary:    Oxygen   HOME O2 Saturation Measurements:(Values must be present for Home Oxygen orders)  Room air sat at rest: 89  Room air sat with amb: 87  With liters of O2: 3, O2 sat at rest with O2: 94  With Liters of O2: 3, O2 sat with amb with O2 : 93  Is the patient mobile?: Yes  If patient feels more short of breath, they can go up to 6 liters per minute and contact healthcare provider.    Supporting Symptoms: The patient requires supplemental oxygen, as the following interventions have been tried with limited or no improvement: \"Bronchodilators and/or steroid inhalers, \"Ambulation with oximetry, and \"Incentive spirometry.    My Clinical findings support the need for the above equipment due to:  Hypoxia  "

## 2022-11-09 NOTE — PROGRESS NOTES
Notified pt , Nael, that patient will most likely be discharged by noon tomorrow. Pt  verbalized understanding.

## 2022-11-09 NOTE — CARE PLAN
Problem: Knowledge Deficit - Standard  Goal: Patient and family/care givers will demonstrate understanding of plan of care, disease process/condition, diagnostic tests and medications  Outcome: Progressing     Problem: Psychosocial Needs:  Goal: Ability to cope will improve  Outcome: Progressing   The patient is Stable - Low risk of patient condition declining or worsening    Shift Goals  Clinical Goals: o2 deliver  Patient Goals: Discharge  Family Goals: Rest    Progress made toward(s) clinical / shift goals:  Patient show understanding of plan of care    Patient is not progressing towards the following goals:

## 2022-11-09 NOTE — PROGRESS NOTES
Received report from prior RN. Patient is laying in semi-bansal's position with chest rise present. POC discussed with patient. Bed in low position. Call light and personal items within reach. All needs addressed at this time.

## 2022-11-09 NOTE — PROGRESS NOTES
Provided patient with incentive spirometer. Patient demonstrated and verbalized understanding.Patient achieved 250 with expected volume to be 500.

## 2022-11-09 NOTE — HOSPITAL COURSE
72-year-old female with past medical history of COPD not previously on home oxygen, hypertension, CKD stage IV, type 2 diabetes who presented to the hospital on 11/3/2022 as a transfer from [facility] for progressive renal failure.  Patient presented to outside hospital on 1030 with complaints of cough and shortness of breath patient was hypoxic requiring oxygen.  Chest x-ray was reported negative however she was significantly hypertensive with a systolic blood pressure over 200 on her arrival here she had no leukocytosis and her hemoglobin was 10.9 creatinine elevated at 3.83 with a BUN 43.  During her stay she was treated with doxycycline and Solu-Medrol for suspected COPD exacerbation.  While here she had worsening progressive renal failure nephrology was consulted and ultimately patient was started on hemodialysis.  Renal ultrasound was negative for obstruction.

## 2022-11-09 NOTE — TELEPHONE ENCOUNTER
RPM Notification: High Alert  Actions: None    Total time (minutes) spent communicating with patient: 0    Alerted at 85% 1155  Reached out to bedside RN. Voalted that pt is alerting at 85% 1155  84% 1201 alerted. Called Joyce Chavez charge to let her know this pt was alerting and that she was on our RPM program. I asked if someone can please go check on her. I reached out to bedside RN with no response yet.    1212 bedside RN responded. Jesi was able to get her oxygen right back up. Jesi still in room getting ready to discharge home

## 2022-11-09 NOTE — RESPIRATORY CARE
COPD EDUCATION by COPD CLINICAL EDUCATOR  11/9/2022 at 9:20 AM by Latasha Dickens, DALIA     Patient reviewed by COPD education team. Patient admitted with COPD exacerbation per progress note. Notified MD of findings and requested remote monitoring. Patient has D/C orders in place.

## 2022-11-09 NOTE — TELEPHONE ENCOUNTER
RPM Notification: Disconnect, Reconnect, and Patient Communication  Actions: None    Total time (minutes) spent communicating with patient: 10min    1455 Called Jesi who likes to be called Dora. She didn't answer. Left a message for her to please give us a call back.   1456 Called Jamar her EC. He answered and I told him who I was. I asked if they got home. He stated they just got home there was a [place] [address]. They live in [place]. He stated Dora needed to be cleaned up after  getting home. He let me talk to her. She stated she was taking device off for a couple hours. I ask that she doesn't as she just got home and we need to be able to see her numbers. She was a little upset and stated she was going to take a nap. I explain she can still wear the device. She stated we need to to call Jamar first if she alerts or disconnects. I let her know I would put a note in to call him first. She was able to reconnect. She stated stating at 87% alert @ 1504. I messaged Jamar via text that to please insure her oxygen was on. I also asked if it was ok if we text him. He messaged that was ok. He messaged that he has her on 4L. 1510 83-18-98% 4L

## 2022-11-09 NOTE — CARE PLAN
The patient is Stable - Low risk of patient condition declining or worsening    Shift Goals  Clinical Goals: Rest  Patient Goals: Rest  Family Goals: Rest    Progress made toward(s) clinical / shift goals:      Problem: Knowledge Deficit - Standard  Goal: Patient and family/care givers will demonstrate understanding of plan of care, disease process/condition, diagnostic tests and medications  Outcome: Progressing     Problem: Hemodynamics  Goal: Patient's hemodynamics, fluid balance and neurologic status will be stable or improve  Outcome: Progressing       Patient is not progressing towards the following goals:

## 2022-11-09 NOTE — TELEPHONE ENCOUNTER
[facility] to the [facility] Remote Patient Monitoring Program.   At 1136 Pt is currently still inpatient at [facility] in room S530/2. Will follow inpatient workflow sheet until pt discharges home.

## 2022-11-09 NOTE — DISCHARGE PLANNING
Spoke To: Rosa Huber  Agency/Facility Name: [facility]  Plan or Request: JOSÉ confirmed referral received, currently processing. JOSÉ Esteban asked if 02 can be delivered to bedside before 12.    Address for pt: [address] [place] [address]

## 2022-11-09 NOTE — RESPIRATORY CARE
REMOTE MONITORING PROGRAM by RESPIRATORY THERAPY  2022 at 11:13 AM by Shaun Goldstein             Patient's name:  Jesi Galloway        MRN: [id number]       : 1950  Physical address: [address] [place] [address] [place] [address]  Cell phone # [phone]  Current oxygen requirement 3  Ref # [id number] & Lot # [id number]  Emergency contact name & number: Jamar Quinonez [phone]  Primary language English            Patient agrees to Remote Monitoring program. Device instruction performed with welcome packet, consent signed and placed at bedside chart. Patient instructed on how to use MyChart and Zoom. No further questions at this time.

## 2022-11-09 NOTE — PROGRESS NOTES
Assumed care of patient at 0700 from Gregoria HENRY. Patient is A&O x4, states pain level is 0/10. Bed locked in lowest position with 2 rail up. Call light  in place, belongings at bedside. Hourly rounding is in place.

## 2022-11-10 ENCOUNTER — TELEPHONE (OUTPATIENT)
Dept: OTHER | Facility: MEDICAL CENTER | Age: 72
End: 2022-11-10
Payer: MEDICARE

## 2022-11-10 VITALS — OXYGEN SATURATION: 87 %

## 2022-11-10 NOTE — TELEPHONE ENCOUNTER
RPM Notification: High Alert and Welcome Call  Actions: None    Total time (minutes) spent communicating with patient: 10    At 1556 alert SpO2 84%.   At 1606 alert SpO2 88%.   Pt prefers for her EC Jamar to be contacted first before trying her cell. Contacted EC Jamar. He answered. I let him know that pt has alerted with low oxygen and currently at 83/84%. He stated that he has to go check on her. He advised me that she is resting but has her oxygen all hooked up. I told him to just make sure her oxygen did not fall off and there are no kinks in the tubing. I also asked if she was laying flat (supine) he stated no last time he check on her she was laying on her side. I told him while she checks on pt and maybe might have to have her take some deep breaths. I asked if I could call back within a about 5-10 minutes so we can go over our welcome call so he can relay to the pt what to expect while being on the program.   At 1620 alert SpO2 87%.   At 1630 alert SpO2 86%.   Contacted pt EC again. He stated he checked on the pt and she was fine and her oxygen was on and she was laying on her side still. I told him the pt might need to reposition herself and prop her self up and maybe needs to take some slow deep breaths. He stated he would have her move. I asked if we could talk about what to expect on the program. Performed Welcome Call. Jamar stated he would relay information to pt.   Stated program is roughly 16-18 days long. Being monitored 24/7.   Stated we only reach out to him and pt when she alerts and/or disconnects from the monitor. Asked if he was fine being the first person of contact or we should contact pt. Jamar stated we could contact him first. I let him know if we are unable to get a hold of either of them during the times of alerts/disconnects we have a protocol to dispatch EMS services to her place as a wellness check. He stated he understood.   Stated that if pt needs to take the monitor off to shower if  they can give us a courtesy call/text to let us know so we don't interrupt her during that time. As well as the monitor not being waterproof so she would have to take the monitor off if need be.   Stated pt will have 2 telephone appointments with our pulmonologist for the program. Let him know those appointments are on 11/15 with Dr. Ricks and 11/28 with Dr. Oliveira. Stated those appointments don't have a set time so expect a call between the hours of [phone].   Stated if she needs additional supplies for the monitor to let us know and we will coordinate to get her some. I stated that we like for pts to pick supplies at the hospital down at the ED area. I stated since they live in [place] that we have an urgent care there that keeps supplies for , if not able to drive in to get some we need enough notice that supplies are low to get them mail out to her. Jamar had no further questions/concerns at this time.

## 2022-11-10 NOTE — TELEPHONE ENCOUNTER
RPM Notification: High Alert  Actions: Contacted DME Company    Total time (minutes) spent communicating with patient: 4    At 1635 alert SpO2 84%.   At 1640 alert  SpO2 83%.   At 1645 alert SpO2 84%.   At 1651 alert SpO2 84%.  At 1656 alert SpO2 83% .  At 1701 alert SpO2 84%.   At 1706 alert SpO2 82%.   In contact with pt ANDREAS Jamar. He stated that her oxygen is not reading numbers and wondering if it is normal. I ask if pt is hooked up to her concentrator that's plugged into the wall. He stated she is on her tank from when she was discharged from the hospital with. I asked if the oxygen company came by and dropped off the concentrator with additional portable tanks he stated no one has come by from his knowledge. I escalated this to my Supervisor RN who stated that he has to call the DME company to let them know they are home so the concentrator can be delivered as well as the additional portable tanks. He stated that he would call. He advised me the portable tank was in the red. I stated to him that it means the portable is empty. I stated that the pt would need to get up from resting to sit up and take good breaths while they are waiting for the concentrator to be delivered and we will keep an eye on her. Contacted pt throughout to make sure pt was still fine and he let me know pt is up and focusing on her breathing. I asked if he was able to get a hold of them and that it was going to be delivered. He advised me he made contact with DME company and it was going to be delivered. I told him if there are any delays or issues with it being delivered to let us know so we could get it escalated for them. At 1718 SpO2 92%. Jamar had messaged us at around 1723 stating the oxygen was delivered and pt is on it so her numbers should be back up. I advised him they are above 90%, and thanked him for letting us know it was delivered. I told him that we will reach back out if she alerts/disconnects.   During Welcome Call  confirmed pt address is the [address] [facility] [address].

## 2022-11-11 ENCOUNTER — TELEPHONE (OUTPATIENT)
Dept: OTHER | Facility: MEDICAL CENTER | Age: 72
End: 2022-11-11
Payer: MEDICARE

## 2022-11-11 NOTE — DISCHARGE SUMMARY
Discharge Summary    CHIEF COMPLAINT ON ADMISSION  No chief complaint on file.      Reason for Admission  Acute renal failure, needs Nephrol*     Admission Date  11/3/2022    CODE STATUS  Prior    HPI & HOSPITAL COURSE  This is a 72 y.o. female with past medical history of COPD not previously on home oxygen, hypertension, CKD stage IV, type 2 diabetes who presented to the hospital on 11/3/2022 as a transfer from [facility] for progressive renal failure.  Patient presented to outside hospital on 10/30 with complaints of cough and shortness of breath patient was hypoxic requiring oxygen.  Chest x-ray was reported negative however she was significantly hypertensive with a systolic blood pressure over 200 on her arrival here she had no leukocytosis and her hemoglobin was 10.9 creatinine elevated at 3.83 with a BUN 43.  During her stay she was treated with doxycycline and Solu-Medrol for suspected COPD exacerbation.  While here she had worsening progressive renal failure nephrology was consulted and ultimately patient was started on hemodialysis.  Renal ultrasound was negative for obstruction.  Patient will be discharge with outpatient hemodialysis. She did continue to require oxygen and was discharged with home oxygen and pulmonary follow up.       Therefore, she is discharged in fair and stable condition to home with close outpatient follow-up.    The patient met 2-midnight criteria for an inpatient stay at the time of discharge.    Discharge Date  11/9/2022    FOLLOW UP ITEMS POST DISCHARGE  O2  needs   Chronic medical condition     DISCHARGE DIAGNOSES  Principal Problem:    Acute renal failure superimposed on stage 4 chronic kidney disease (HCC) POA: Yes  Active Problems:    Chronic obstructive pulmonary disease with acute exacerbation (HCC) POA: Yes    Chronic heart failure with preserved ejection fraction (HCC) POA: Yes    Moderate aortic stenosis POA: Yes    Primary hypertension POA: Yes    Leukocytosis POA:  Yes    Anemia POA: Yes    Diabetes mellitus (HCC) POA: Yes    Pulmonary artery hypertension (HCC) POA: Yes  Resolved Problems:    Nausea POA: Yes      FOLLOW UP  Future Appointments   Date Time Provider Department Center   11/15/2022  4:40 PM Ottoniel Ricks M.D. [facility] None   11/28/2022  4:00 PM Gema Oliveira M.D. [facility] None     Jose Armando Harris M.D.  [address] [place] [id number]  [phone]    Schedule an appointment as soon as possible for a visit in 1 week(s)        MEDICATIONS ON DISCHARGE     Medication List        START taking these medications        Instructions   budesonide-formoterol 160-4.5 MCG/ACT Aero  Commonly known as: SYMBICORT   Inhale 2 Puffs 2 times a day.  Dose: 2 Puff     calcium acetate 667 MG Tabs tablet  Commonly known as: PHOS-LO   Take 1 Tablet by mouth 3 times a day before meals.  Dose: 667 mg     metoclopramide 5 MG tablet  Commonly known as: REGLAN   Take 1 Tablet by mouth 3 times a day as needed (nausea) for up to 5 days.  Dose: 5 mg     omeprazole 20 MG delayed-release capsule  Commonly known as: PRILOSEC   Take 1 Capsule by mouth 2 times a day for 14 days.  Dose: 20 mg            CONTINUE taking these medications        Instructions   albuterol 108 (90 Base) MCG/ACT Aers inhalation aerosol   Inhale 2 Puffs every four hours as needed.  Dose: 2 Puff     amLODIPine 10 MG Tabs  Commonly known as: NORVASC   Take 10 mg by mouth every day.  Dose: 10 mg     carvedilol 25 MG Tabs  Commonly known as: COREG   Take 25 mg by mouth 2 times a day with meals.  Dose: 25 mg     ezetimibe 10 MG Tabs  Commonly known as: ZETIA   Take 10 mg by mouth every day.  Dose: 10 mg     guaiFENesin  MG Tb12  Commonly known as: MUCINEX   Take 600 mg by mouth every 12 hours.  Dose: 600 mg     loperamide 2 MG Caps  Commonly known as: IMODIUM   Take 2 mg by mouth 4 times a day as needed for Diarrhea.  Dose: 2 mg     PARoxetine 10 MG Tabs  Commonly known as: PAXIL   Take 10 mg by mouth every  day.  Dose: 10 mg     potassium citrate SR 10 MEQ (1080 MG) Tbcr  Commonly known as: UROCIT-K SR   Take 10 mEq by mouth every day.  Dose: 10 mEq     torsemide 20 MG Tabs  Commonly known as: DEMADEX   Take 20 mg by mouth every day.  Dose: 20 mg     traZODone 50 MG Tabs  Commonly known as: DESYREL   Take 25 mg by mouth every evening. Indications: Trouble Sleeping  Dose: 25 mg            STOP taking these medications      diphenhydrAMINE 25 MG Tabs  Commonly known as: BENADRYL              Allergies  Allergies   Allergen Reactions    Codeine Nausea    Hydrocodone Nausea       DIET  No orders of the defined types were placed in this encounter.      ACTIVITY  As tolerated.      CONSULTATIONS  Nephrology   Vascular surgery     PROCEDURES  Procedure:  Creation of a left radiocephalic AV fistula  Percutaneous access of the right IJ vein with ultrasound guidance and insertion of a right IJ tunneled cuffed dual lumen dialysis catheter    LABORATORY  Lab Results   Component Value Date    SODIUM 137 11/08/2022    POTASSIUM 3.8 11/08/2022    CHLORIDE 98 11/08/2022    CO2 27 11/08/2022    GLUCOSE 95 11/08/2022    BUN 21 11/08/2022    CREATININE 2.23 (H) 11/08/2022        Lab Results   Component Value Date    WBC 10.7 11/08/2022    HEMOGLOBIN 11.0 (L) 11/08/2022    HEMATOCRIT 34.9 (L) 11/08/2022    PLATELETCT 180 11/08/2022        Total time of the discharge process exceeds 36 minutes.

## 2022-11-11 NOTE — TELEPHONE ENCOUNTER
RPM Notification: Withdraw  Actions: Physician Notification    Total time (minutes) spent communicating with patient: 10        I called patient to check in on her as she has been disconnected from monitor for 4 hours. She stated that there was too much going on and that she would like to be removed. I did let her know that her two visits scheduled with the  Pulmonologist's will not happen and that she needs to reach out to her Primary Care with any additional questions. She stated that she understands. Will remove her from the program. We did make Dr. Oliveira aware she was withdrawing from the program.

## 2022-11-11 NOTE — TELEPHONE ENCOUNTER
RPM Notification: Medium Alert  Actions: None    Total time (minutes) spent communicating with patient: 2    Pt alerted with a low SpO2 of 87% at 2146 and then again at 2156. Spoke with ec, pt had briefly taken off oxygen but had put it back on and oxygen increased back up to normal range. Will continue to monitor and reach out for any alerts.

## 2022-11-11 NOTE — TELEPHONE ENCOUNTER
RPM Notification: Disconnect  Actions: None    Total time (minutes) spent communicating with patient: 2    Jsei called to let us know she was going to disconnect for a couple hours. She said she will give us a call when she is ready to reconnect.      Current vitals: HR: 95, RR: 16, O2: 93%

## 2022-11-15 ENCOUNTER — APPOINTMENT (OUTPATIENT)
Dept: SLEEP MEDICINE | Facility: MEDICAL CENTER | Age: 72
End: 2022-11-15
Payer: MEDICARE

## (undated) DEVICE — SET EXTENSION WITH 2 PORTS (48EA/CA) ***PART #2C8610 IS A SUBSTITUTE*****

## (undated) DEVICE — SUTURE 3-0 VICRYL PLUS SH - 8X 18 INCH (12/BX)

## (undated) DEVICE — CHLORAPREP 26 ML APPLICATOR - ORANGE TINT(25/CA)

## (undated) DEVICE — PACK AV FISTULA (4EA/CA)

## (undated) DEVICE — SODIUM CHL IRRIGATION 0.9% 1000ML (12EA/CA)

## (undated) DEVICE — SUTURE 4-0 30CM STRATAFIX SPIRAL PS-2 (12EA/BX)

## (undated) DEVICE — SENSOR OXIMETER ADULT SPO2 RD SET (20EA/BX)

## (undated) DEVICE — COVER PROBE STERILE CONE (12EA/CA)

## (undated) DEVICE — PAD PREP 24 X 48 CUFFED - (100/CA)

## (undated) DEVICE — SHEET THYROID - (10EA/CA)

## (undated) DEVICE — GELAQUASONIC 100 ULTRASOUND - 48/BX 20GM STERILE FOIL POUCH

## (undated) DEVICE — GOWN SURGEONS LARGE - (32/CA)

## (undated) DEVICE — CLIP MED INTNL HRZN TI ESCP - (25/BX)

## (undated) DEVICE — LACTATED RINGERS INJ 1000 ML - (14EA/CA 60CA/PF)

## (undated) DEVICE — DRAPE C-ARM LARGE 41IN X 74 IN - (10/BX 2BX/CA)

## (undated) DEVICE — DRAPE LOWER EXTREMETY - (6/CA)

## (undated) DEVICE — COVER LIGHT HANDLE ALC PLUS DISP (18EA/BX)

## (undated) DEVICE — DRAPE LARGE 3 QUARTER - (20/CA)

## (undated) DEVICE — SOD. CHL. INJ. 0.9% 250 ML - (36/CA 50CA/PF)

## (undated) DEVICE — SUTURE GENERAL

## (undated) DEVICE — CANISTER SUCTION 3000ML MECHANICAL FILTER AUTO SHUTOFF MEDI-VAC NONSTERILE LF DISP  (40EA/CA)

## (undated) DEVICE — SUCTION INSTRUMENT YANKAUER BULBOUS TIP W/O VENT (50EA/CA)

## (undated) DEVICE — MEDICINE CUP STERILE 2 OZ - (100/CA)

## (undated) DEVICE — SLEEVE VASO CALF MED - (10PR/CA)

## (undated) DEVICE — TUBING CLEARLINK DUO-VENT - C-FLO (48EA/CA)

## (undated) DEVICE — SUTURE 4-0 SILK 12 X 18 INCH - (36/BX)

## (undated) DEVICE — SUTURE 4-0 MONOCRYL PLUS PS-2 - 27 INCH (36/BX)

## (undated) DEVICE — DECANTER FLD BLS - (50/CA)

## (undated) DEVICE — BLADE SURGICAL #11 - (50/BX)

## (undated) DEVICE — GOWN WARMING STANDARD FLEX - (30/CA)

## (undated) DEVICE — SLEEVE, VASO, THIGH, MED

## (undated) DEVICE — CLIP SM INTNL HRZN TI ESCP LGT - (24EA/PK 25PK/BX)

## (undated) DEVICE — GLOVE BIOGEL SZ 8 SURGICAL PF LTX - (50PR/BX 4BX/CA)

## (undated) DEVICE — SUTURE 6-0 PROLENE BV-1 D/A 24 (36PK/BX)"

## (undated) DEVICE — SUTURE 2-0 ETHILON FS - (36/BX) 18 INCH

## (undated) DEVICE — ELECTRODE DUAL RETURN W/ CORD - (50/PK)

## (undated) DEVICE — PACK MINOR BASIN - (2EA/CA)

## (undated) DEVICE — SYRINGE NON SAFETY 5 CC 20 GA X 1-1/2 IN (100/BX 4BX/CA)

## (undated) DEVICE — VESSELOOP MINI BLUE STERILE - SURG-I-LOOP (10EA/BX)

## (undated) DEVICE — TOWEL STOP TIMEOUT SAFETY FLAG (40EA/CA)

## (undated) DEVICE — SYRINGE 20 ML LL (50EA/BX 4BX/CA)

## (undated) DEVICE — SET LEADWIRE 5 LEAD BEDSIDE DISPOSABLE ECG (1SET OF 5/EA)